# Patient Record
Sex: FEMALE | Race: OTHER | Employment: OTHER | URBAN - METROPOLITAN AREA
[De-identification: names, ages, dates, MRNs, and addresses within clinical notes are randomized per-mention and may not be internally consistent; named-entity substitution may affect disease eponyms.]

---

## 2017-11-13 ENCOUNTER — APPOINTMENT (OUTPATIENT)
Dept: GENERAL RADIOLOGY | Facility: HOSPITAL | Age: 82
End: 2017-11-13
Attending: EMERGENCY MEDICINE
Payer: MEDICARE

## 2017-11-13 ENCOUNTER — HOSPITAL ENCOUNTER (EMERGENCY)
Facility: HOSPITAL | Age: 82
Discharge: HOME | End: 2017-11-13
Attending: EMERGENCY MEDICINE
Payer: MEDICARE

## 2017-11-13 VITALS
HEIGHT: 65 IN | TEMPERATURE: 98 F | SYSTOLIC BLOOD PRESSURE: 189 MMHG | RESPIRATION RATE: 18 BRPM | DIASTOLIC BLOOD PRESSURE: 81 MMHG | OXYGEN SATURATION: 97 % | WEIGHT: 140 LBS | HEART RATE: 68 BPM | BODY MASS INDEX: 23.32 KG/M2

## 2017-11-13 DIAGNOSIS — IMO0001 COMPRESSION FRACTURE OF VERTEBRA, INITIAL ENCOUNTER: Primary | ICD-10-CM

## 2017-11-13 PROCEDURE — 99283 EMERGENCY DEPT VISIT LOW MDM: CPT

## 2017-11-13 PROCEDURE — 99284 EMERGENCY DEPT VISIT MOD MDM: CPT

## 2017-11-13 PROCEDURE — 72100 X-RAY EXAM L-S SPINE 2/3 VWS: CPT | Performed by: EMERGENCY MEDICINE

## 2017-11-13 PROCEDURE — 73502 X-RAY EXAM HIP UNI 2-3 VIEWS: CPT | Performed by: EMERGENCY MEDICINE

## 2017-11-13 RX ORDER — HYDROCORTISONE 10 MG/1
10 TABLET ORAL 2 TIMES DAILY
Status: ON HOLD | COMMUNITY
End: 2019-05-17

## 2017-11-13 RX ORDER — TRAMADOL HYDROCHLORIDE 50 MG/1
TABLET ORAL EVERY 4 HOURS PRN
Qty: 20 TABLET | Refills: 0 | Status: SHIPPED | OUTPATIENT
Start: 2017-11-13 | End: 2017-11-20

## 2017-11-13 NOTE — ED INITIAL ASSESSMENT (HPI)
PT c/o right hip pain that radiates to back for past week. Pt had hip surgery 2yrs ago. Pt daughter believes the pain is arthritic in nature.  Pt to see orthopedic doctor Dec. 6

## 2017-11-13 NOTE — ED PROVIDER NOTES
Patient Seen in: [facility] Emergency Department    History   Patient presents with:  Hip Pain    Stated Complaint: pain in the right hip that radiates to the back for the past 4-5 days    HPI    This is a 66-year-old female complaining of right low acute distress  Abdomen soft nontender the  The back there is no tenderness to palpation. The motor strength lower extremities is normal the deep tendon reflex normal sensations intact the patient has range of motion of the right hip.   Straight leg raisin

## 2017-11-24 ENCOUNTER — HOSPITAL ENCOUNTER (EMERGENCY)
Facility: HOSPITAL | Age: 82
Discharge: HOME | End: 2017-11-25
Attending: EMERGENCY MEDICINE
Payer: MEDICARE

## 2017-11-24 DIAGNOSIS — M54.10 ACUTE LOW BACK PAIN WITH RADICULAR SYMPTOMS, DURATION LESS THAN 6 WEEKS: Primary | ICD-10-CM

## 2017-11-24 DIAGNOSIS — S22.080A T12 COMPRESSION FRACTURE (HCC): ICD-10-CM

## 2017-11-24 DIAGNOSIS — R31.9 HEMATURIA, UNSPECIFIED TYPE: ICD-10-CM

## 2017-11-24 PROCEDURE — 80053 COMPREHEN METABOLIC PANEL: CPT | Performed by: EMERGENCY MEDICINE

## 2017-11-24 PROCEDURE — 96375 TX/PRO/DX INJ NEW DRUG ADDON: CPT

## 2017-11-24 PROCEDURE — 99284 EMERGENCY DEPT VISIT MOD MDM: CPT

## 2017-11-24 PROCEDURE — 83690 ASSAY OF LIPASE: CPT | Performed by: EMERGENCY MEDICINE

## 2017-11-24 PROCEDURE — 96361 HYDRATE IV INFUSION ADD-ON: CPT

## 2017-11-24 PROCEDURE — 96374 THER/PROPH/DIAG INJ IV PUSH: CPT

## 2017-11-24 PROCEDURE — 96376 TX/PRO/DX INJ SAME DRUG ADON: CPT

## 2017-11-24 PROCEDURE — 81001 URINALYSIS AUTO W/SCOPE: CPT | Performed by: EMERGENCY MEDICINE

## 2017-11-24 PROCEDURE — 85025 COMPLETE CBC W/AUTO DIFF WBC: CPT | Performed by: EMERGENCY MEDICINE

## 2017-11-24 RX ORDER — SODIUM CHLORIDE 9 MG/ML
1000 INJECTION, SOLUTION INTRAVENOUS ONCE
Status: COMPLETED | OUTPATIENT
Start: 2017-11-24 | End: 2017-11-25

## 2017-11-25 ENCOUNTER — APPOINTMENT (OUTPATIENT)
Dept: CT IMAGING | Facility: HOSPITAL | Age: 82
End: 2017-11-25
Attending: EMERGENCY MEDICINE
Payer: MEDICARE

## 2017-11-25 VITALS
DIASTOLIC BLOOD PRESSURE: 81 MMHG | HEART RATE: 74 BPM | WEIGHT: 136 LBS | BODY MASS INDEX: 25.03 KG/M2 | OXYGEN SATURATION: 97 % | HEIGHT: 62 IN | RESPIRATION RATE: 16 BRPM | SYSTOLIC BLOOD PRESSURE: 164 MMHG | TEMPERATURE: 98 F

## 2017-11-25 PROCEDURE — 74176 CT ABD & PELVIS W/O CONTRAST: CPT | Performed by: EMERGENCY MEDICINE

## 2017-11-25 RX ORDER — OXYCODONE HYDROCHLORIDE AND ACETAMINOPHEN 5; 325 MG/1; MG/1
1-2 TABLET ORAL EVERY 4 HOURS PRN
Qty: 16 TABLET | Refills: 0 | Status: SHIPPED | OUTPATIENT
Start: 2017-11-25 | End: 2017-12-02

## 2017-11-25 RX ORDER — HYDROMORPHONE HYDROCHLORIDE 1 MG/ML
INJECTION, SOLUTION INTRAMUSCULAR; INTRAVENOUS; SUBCUTANEOUS
Status: DISCONTINUED
Start: 2017-11-25 | End: 2017-11-25

## 2017-11-25 RX ORDER — HYDROMORPHONE HYDROCHLORIDE 1 MG/ML
0.5 INJECTION, SOLUTION INTRAMUSCULAR; INTRAVENOUS; SUBCUTANEOUS ONCE
Status: COMPLETED | OUTPATIENT
Start: 2017-11-25 | End: 2017-11-25

## 2017-11-25 RX ORDER — LIDOCAINE 50 MG/G
1 PATCH TOPICAL EVERY 24 HOURS
Qty: 7 PATCH | Refills: 0 | Status: SHIPPED | OUTPATIENT
Start: 2017-11-25 | End: 2017-12-02

## 2017-11-25 RX ORDER — LIDOCAINE 50 MG/G
1 PATCH TOPICAL ONCE
Status: DISCONTINUED | OUTPATIENT
Start: 2017-11-25 | End: 2017-11-25

## 2017-11-25 RX ORDER — ONDANSETRON 4 MG/1
4 TABLET, ORALLY DISINTEGRATING ORAL EVERY 4 HOURS PRN
Qty: 10 TABLET | Refills: 0 | Status: SHIPPED | OUTPATIENT
Start: 2017-11-25 | End: 2017-12-02

## 2017-11-25 NOTE — ED PROVIDER NOTES
Patient Seen in: [facility] Emergency Department    History   Patient presents with:  Back Pain (musculoskeletal)    Stated Complaint:     HPI  Severe back pain wrapping around the left side of her abdomen was originally wrapping around the right si place, and time. She appears well-developed and well-nourished. She appears distressed. HENT:   Head: Normocephalic. Right Ear: External ear normal.   Left Ear: External ear normal.   Nose: Nose normal.   Mouth/Throat: No oropharyngeal exudate.    Eyes: Urine Large (*)     Protein Urine 30 mg/dL (*)     All other components within normal limits   URINE MICROSCOPIC W REFLEX CULTURE - Abnormal; Notable for the following:     RBC URINE >10 (*)     Bacteria Urine Rare (*)     Hyaline Casts Present (*)     Muc (Reviewed)  ------------------------------------------------------------  Time: 11/25 0128  Value: OCCULT BLOOD: (!) Large  Comment: (Reviewed)  ------------------------------------------------------------  Time: 11/25 0128  Value: KETONES (URINE DIPSTICK) quite a lot of pain here in the emergency department. She does supposedly have a codeine allergy but it gives her a feeling of heartburn.   Hammond she tolerated well in the emergency department and therefore she was given some Dilaudid in the emergency d R-0    lidocaine 5 % External Patch  Place 1 patch onto the skin daily. , Normal, Disp-7 patch, R-0

## 2017-11-25 NOTE — ED INITIAL ASSESSMENT (HPI)
Pt seen here 10 days ago, diagnosed with fracture to spine, states unable to tolerate back pain. Denies incontinence. States has an appointment with Dr Feroz Rodriguez on 12/4 but cannot wait secondary to the pain. Pt takes tramadol at home for back pain.

## 2017-12-08 PROBLEM — S22.080A T12 COMPRESSION FRACTURE (HCC): Status: ACTIVE | Noted: 2017-12-08

## 2018-05-30 PROBLEM — E27.8 ADRENAL HYPERPLASIA (HCC): Status: ACTIVE | Noted: 2018-05-30

## 2018-05-30 PROBLEM — I77.89 ECTASIA OF ARTERY (HCC): Status: ACTIVE | Noted: 2018-05-30

## 2018-05-30 PROBLEM — J44.9 COPD (CHRONIC OBSTRUCTIVE PULMONARY DISEASE) (HCC): Status: ACTIVE | Noted: 2018-05-30

## 2018-05-30 PROBLEM — J84.10 FIBROSIS LUNG (HCC): Status: ACTIVE | Noted: 2018-05-30

## 2019-01-01 ENCOUNTER — TELEPHONE (OUTPATIENT)
Dept: NEUROLOGY | Facility: CLINIC | Age: 84
End: 2019-01-01

## 2019-01-01 ENCOUNTER — OFFICE VISIT (OUTPATIENT)
Dept: NEUROLOGY | Facility: CLINIC | Age: 84
End: 2019-01-01
Payer: MEDICARE

## 2019-01-01 VITALS — HEART RATE: 82 BPM | DIASTOLIC BLOOD PRESSURE: 68 MMHG | RESPIRATION RATE: 14 BRPM | SYSTOLIC BLOOD PRESSURE: 116 MMHG

## 2019-01-01 DIAGNOSIS — I63.9 CEREBROVASCULAR ACCIDENT (CVA), UNSPECIFIED MECHANISM (HCC): Primary | ICD-10-CM

## 2019-01-01 PROCEDURE — 99213 OFFICE O/P EST LOW 20 MIN: CPT | Performed by: OTHER

## 2019-01-01 RX ORDER — AMOXICILLIN 500 MG/1
500 CAPSULE ORAL 3 TIMES DAILY
Status: ON HOLD | COMMUNITY
End: 2020-01-01

## 2019-01-01 RX ORDER — LISINOPRIL 20 MG/1
20 TABLET ORAL DAILY
Refills: 3 | COMMUNITY
Start: 2019-09-26

## 2019-01-01 RX ORDER — ASPIRIN 81 MG/1
81 TABLET ORAL DAILY
Qty: 90 TABLET | Refills: 1 | Status: SHIPPED | OUTPATIENT
Start: 2019-01-01

## 2019-01-01 RX ORDER — ATORVASTATIN CALCIUM 20 MG/1
20 TABLET, FILM COATED ORAL NIGHTLY
Qty: 90 TABLET | Refills: 0 | Status: SHIPPED | OUTPATIENT
Start: 2019-01-01 | End: 2020-01-01

## 2019-05-13 ENCOUNTER — APPOINTMENT (OUTPATIENT)
Dept: GENERAL RADIOLOGY | Facility: HOSPITAL | Age: 84
DRG: 064 | End: 2019-05-13
Attending: EMERGENCY MEDICINE
Payer: MEDICARE

## 2019-05-13 ENCOUNTER — APPOINTMENT (OUTPATIENT)
Dept: CT IMAGING | Facility: HOSPITAL | Age: 84
DRG: 064 | End: 2019-05-13
Attending: EMERGENCY MEDICINE
Payer: MEDICARE

## 2019-05-13 ENCOUNTER — HOSPITAL ENCOUNTER (INPATIENT)
Facility: HOSPITAL | Age: 84
LOS: 4 days | Discharge: NURSING, REHAB OR LONG-TERM CARE | DRG: 064 | End: 2019-05-17
Attending: EMERGENCY MEDICINE | Admitting: HOSPITALIST
Payer: MEDICARE

## 2019-05-13 DIAGNOSIS — R77.8 ELEVATED TROPONIN: ICD-10-CM

## 2019-05-13 DIAGNOSIS — A41.9 SEPSIS DUE TO PNEUMONIA (HCC): Primary | ICD-10-CM

## 2019-05-13 DIAGNOSIS — J18.9 SEPSIS DUE TO PNEUMONIA (HCC): Primary | ICD-10-CM

## 2019-05-13 PROCEDURE — 70450 CT HEAD/BRAIN W/O DYE: CPT | Performed by: EMERGENCY MEDICINE

## 2019-05-13 PROCEDURE — 71045 X-RAY EXAM CHEST 1 VIEW: CPT | Performed by: EMERGENCY MEDICINE

## 2019-05-13 PROCEDURE — 72125 CT NECK SPINE W/O DYE: CPT | Performed by: EMERGENCY MEDICINE

## 2019-05-13 PROCEDURE — 99223 1ST HOSP IP/OBS HIGH 75: CPT | Performed by: HOSPITALIST

## 2019-05-13 RX ORDER — DICLOFENAC SODIUM 75 MG/1
100 TABLET, DELAYED RELEASE ORAL DAILY
COMMUNITY
End: 2019-01-01 | Stop reason: ALTCHOICE

## 2019-05-13 RX ORDER — ONDANSETRON 2 MG/ML
4 INJECTION INTRAMUSCULAR; INTRAVENOUS ONCE
Status: COMPLETED | OUTPATIENT
Start: 2019-05-13 | End: 2019-05-13

## 2019-05-13 RX ORDER — SODIUM CHLORIDE 9 MG/ML
INJECTION, SOLUTION INTRAVENOUS CONTINUOUS
Status: DISCONTINUED | OUTPATIENT
Start: 2019-05-13 | End: 2019-05-15

## 2019-05-13 RX ORDER — ACETAMINOPHEN 500 MG
500 TABLET ORAL EVERY 6 HOURS PRN
Status: DISCONTINUED | OUTPATIENT
Start: 2019-05-13 | End: 2019-05-17

## 2019-05-13 RX ORDER — MULTIVIT WITH MINERALS/LUTEIN
1000 TABLET ORAL DAILY
COMMUNITY

## 2019-05-13 RX ORDER — HYDROCORTISONE 10 MG/1
10 TABLET ORAL 2 TIMES DAILY
Status: DISCONTINUED | OUTPATIENT
Start: 2019-05-13 | End: 2019-05-17

## 2019-05-13 RX ORDER — ACETAMINOPHEN 500 MG
1000 TABLET ORAL EVERY 6 HOURS PRN
Status: DISCONTINUED | OUTPATIENT
Start: 2019-05-13 | End: 2019-05-17

## 2019-05-13 RX ORDER — SODIUM CHLORIDE 9 MG/ML
INJECTION, SOLUTION INTRAVENOUS CONTINUOUS
Status: ACTIVE | OUTPATIENT
Start: 2019-05-13 | End: 2019-05-14

## 2019-05-14 ENCOUNTER — APPOINTMENT (OUTPATIENT)
Dept: CV DIAGNOSTICS | Facility: HOSPITAL | Age: 84
DRG: 064 | End: 2019-05-14
Attending: INTERNAL MEDICINE
Payer: MEDICARE

## 2019-05-14 ENCOUNTER — APPOINTMENT (OUTPATIENT)
Dept: MRI IMAGING | Facility: HOSPITAL | Age: 84
DRG: 064 | End: 2019-05-14
Attending: Other
Payer: MEDICARE

## 2019-05-14 PROCEDURE — 70544 MR ANGIOGRAPHY HEAD W/O DYE: CPT | Performed by: OTHER

## 2019-05-14 PROCEDURE — 99232 SBSQ HOSP IP/OBS MODERATE 35: CPT | Performed by: INTERNAL MEDICINE

## 2019-05-14 PROCEDURE — 93306 TTE W/DOPPLER COMPLETE: CPT | Performed by: INTERNAL MEDICINE

## 2019-05-14 PROCEDURE — 70553 MRI BRAIN STEM W/O & W/DYE: CPT | Performed by: OTHER

## 2019-05-14 PROCEDURE — 99223 1ST HOSP IP/OBS HIGH 75: CPT | Performed by: OTHER

## 2019-05-14 PROCEDURE — 95819 EEG AWAKE AND ASLEEP: CPT | Performed by: OTHER

## 2019-05-14 RX ORDER — BISACODYL 10 MG
10 SUPPOSITORY, RECTAL RECTAL
Status: DISCONTINUED | OUTPATIENT
Start: 2019-05-14 | End: 2019-05-17

## 2019-05-14 RX ORDER — ACETAMINOPHEN 325 MG/1
650 TABLET ORAL EVERY 4 HOURS PRN
Status: DISCONTINUED | OUTPATIENT
Start: 2019-05-14 | End: 2019-05-17

## 2019-05-14 RX ORDER — ONDANSETRON 2 MG/ML
4 INJECTION INTRAMUSCULAR; INTRAVENOUS EVERY 6 HOURS PRN
Status: DISCONTINUED | OUTPATIENT
Start: 2019-05-14 | End: 2019-05-17

## 2019-05-14 RX ORDER — SODIUM PHOSPHATE, DIBASIC AND SODIUM PHOSPHATE, MONOBASIC 7; 19 G/133ML; G/133ML
1 ENEMA RECTAL ONCE AS NEEDED
Status: DISCONTINUED | OUTPATIENT
Start: 2019-05-14 | End: 2019-05-17

## 2019-05-14 RX ORDER — FAMOTIDINE 10 MG/ML
20 INJECTION, SOLUTION INTRAVENOUS DAILY
Status: DISCONTINUED | OUTPATIENT
Start: 2019-05-14 | End: 2019-05-16

## 2019-05-14 RX ORDER — METOCLOPRAMIDE HYDROCHLORIDE 5 MG/ML
10 INJECTION INTRAMUSCULAR; INTRAVENOUS EVERY 8 HOURS PRN
Status: DISCONTINUED | OUTPATIENT
Start: 2019-05-14 | End: 2019-05-17

## 2019-05-14 RX ORDER — DOCUSATE SODIUM 100 MG/1
100 CAPSULE, LIQUID FILLED ORAL 2 TIMES DAILY
Status: DISCONTINUED | OUTPATIENT
Start: 2019-05-14 | End: 2019-05-17

## 2019-05-14 RX ORDER — MORPHINE SULFATE 4 MG/ML
2 INJECTION, SOLUTION INTRAMUSCULAR; INTRAVENOUS EVERY 2 HOUR PRN
Status: DISCONTINUED | OUTPATIENT
Start: 2019-05-14 | End: 2019-05-17

## 2019-05-14 RX ORDER — ACETAMINOPHEN 650 MG/1
650 SUPPOSITORY RECTAL EVERY 4 HOURS PRN
Status: DISCONTINUED | OUTPATIENT
Start: 2019-05-14 | End: 2019-05-17

## 2019-05-14 RX ORDER — FAMOTIDINE 20 MG/1
20 TABLET ORAL DAILY
Status: DISCONTINUED | OUTPATIENT
Start: 2019-05-14 | End: 2019-05-17

## 2019-05-14 RX ORDER — ASPIRIN 325 MG
325 TABLET ORAL DAILY
Status: DISCONTINUED | OUTPATIENT
Start: 2019-05-14 | End: 2019-05-15

## 2019-05-14 RX ORDER — LABETALOL HYDROCHLORIDE 5 MG/ML
10 INJECTION, SOLUTION INTRAVENOUS EVERY 10 MIN PRN
Status: DISCONTINUED | OUTPATIENT
Start: 2019-05-14 | End: 2019-05-17

## 2019-05-14 RX ORDER — SENNOSIDES 8.6 MG
17.2 TABLET ORAL NIGHTLY
Status: DISCONTINUED | OUTPATIENT
Start: 2019-05-14 | End: 2019-05-17

## 2019-05-14 RX ORDER — MORPHINE SULFATE 4 MG/ML
1 INJECTION, SOLUTION INTRAMUSCULAR; INTRAVENOUS EVERY 2 HOUR PRN
Status: DISCONTINUED | OUTPATIENT
Start: 2019-05-14 | End: 2019-05-17

## 2019-05-14 RX ORDER — LORAZEPAM 2 MG/ML
1 INJECTION INTRAMUSCULAR ONCE
Status: COMPLETED | OUTPATIENT
Start: 2019-05-14 | End: 2019-05-14

## 2019-05-14 RX ORDER — ASPIRIN 300 MG
300 SUPPOSITORY, RECTAL RECTAL DAILY
Status: DISCONTINUED | OUTPATIENT
Start: 2019-05-14 | End: 2019-05-15

## 2019-05-14 RX ORDER — POLYETHYLENE GLYCOL 3350 17 G/17G
17 POWDER, FOR SOLUTION ORAL DAILY PRN
Status: DISCONTINUED | OUTPATIENT
Start: 2019-05-14 | End: 2019-05-17

## 2019-05-14 RX ORDER — SODIUM CHLORIDE 9 MG/ML
INJECTION, SOLUTION INTRAVENOUS CONTINUOUS
Status: DISCONTINUED | OUTPATIENT
Start: 2019-05-14 | End: 2019-05-15

## 2019-05-14 RX ORDER — ATORVASTATIN CALCIUM 80 MG/1
80 TABLET, FILM COATED ORAL NIGHTLY
Status: DISCONTINUED | OUTPATIENT
Start: 2019-05-14 | End: 2019-05-15

## 2019-05-14 RX ORDER — SODIUM CHLORIDE 9 MG/ML
INJECTION, SOLUTION INTRAVENOUS ONCE
Status: COMPLETED | OUTPATIENT
Start: 2019-05-14 | End: 2019-05-14

## 2019-05-14 NOTE — PROGRESS NOTES
[facility]  Progress Note     Jessica Cochran Patient Status:  Inpatient    1923 MRN [id number]   [facility] 5NW-A Attending Bharath Barbour MD   Hosp Day # 1 PCP PHYSICIAN NONSTAFF     Chief Complaint: near syncope    S: Tc Esparza hydrocortisone  10 mg Oral BID   • piperacillin-tazobactam  3.375 g Intravenous Q8H       ASSESSMENT / PLAN:     1. Pneumonia  1. Continue IV abx  2. Follow cultures  3. O2 as needed  4. Lactic acid now normal  5. Follow WBC  2.  Elevated Troponin/Demand Is

## 2019-05-14 NOTE — H&P
[facility]  History and Physical     Carlita Salinas Patient Status:  Inpatient    1923 MRN [id number]   [facility] 5NW-A Attending Marcel Albrecht MD   Hosp Day # 0 PCP PHYSICIAN NONSTAFF     Chief Complaint: Near syncope    Hi 1,000 mg by mouth daily. Disp:  Rfl:    Diclofenac Sodium 75 MG Oral Tab EC Take 100 mg by mouth daily. Disp:  Rfl:    Fish Oil-Cholecalciferol (FISH OIL + D3 OR) Take 1 capsule by mouth daily. Disp:  Rfl:    MAGNESIUM OR Take 250 mg by mouth daily.  Disp 98   CO2 27.0   ALKPHO 123   AST 88*   ALT 31   BILT 0.7   TP 8.8*       Estimated Creatinine Clearance: 38.7 mL/min (based on SCr of 0.75 mg/dL). No results for input(s): PTP, INR in the last 168 hours.     Recent Labs   Lab 05/13/19 2020   TROP 3.090*

## 2019-05-14 NOTE — CONSULTS
Neurology H&P    Jesse Nance Patient Status:  Inpatient    1923 MRN [id number]   [facility] 5NW-A Attending Mariana Portillo MD   [facility] Day # 1 PCP PHYSICIAN NONSTAFF     Subjective:  Jesse Nance is a(n) 80year old female with PMH o 05/01/2015    YASH       SocHx:  Social History    Tobacco Use      Smoking status: Never Smoker      Smokeless tobacco: Never Used    Alcohol use: Yes      Comment: occ    Drug use: No      Family History:  Family History   Problem Relation Age of Onset Fasiculations      SENSORY EXAMINATION:  UE: intact to light touch, pinprick reduced on LUE  LE: intact to light touch, pinprick reduced on LLE    COORDINATION:  No dysmetria, or intention tremors; normal JERRY    REFLEXES: 2+ at biceps, 2+ triceps, 2+ at pa Plan:  1. L sided hemiplegia  - Unclear onset  - CTH imaging was reviewed in detail  - MRI/MRA brain ordered  - CPK: 830  - Continue ASA 325mg Qday and Atrovastatin  - BP goals <150SBP  - TTE pending  - PT/OT    Veatrice Sessions, DO  Neurology

## 2019-05-14 NOTE — OCCUPATIONAL THERAPY NOTE
OT orders received, chart reviewed, and attempted to see pt. However, per RN, there is suspicion for a possible stroke. There is a neuro consult pending and CVA order set was just placed.  Pt is being transferred to telemetry due to stroke work up, OT will

## 2019-05-14 NOTE — PHYSICAL THERAPY NOTE
PT orders received, chart reviewed, and attempted to see pt. However, per RN, there is suspicion for a possible stroke. There is a neuro consult pending and CVA order set.  Pt is being transferred to telemetry and therefore will require new PT orders when p

## 2019-05-14 NOTE — PROGRESS NOTES
Pt received from ER via cart  Bed in low and locked position  Call light within reach  Dr Harmon Crest here to see pt.

## 2019-05-14 NOTE — SLP NOTE
Attempted evaluation, patient occupied with EEG. RN reports patient passed RN dysphagia screen and is tolerating po without overt signs of aspiration. Will follow up 5/15/19 to assess per protocol as able.     Aurora Bhatt Felix 87 CCC-SLP  Pager [phone]

## 2019-05-14 NOTE — CM/SW NOTE
Pt is a 81 yo  female admitted for fall at home. Pt lives alone in an apartment. She has 5 children - her dtr Dominique Kirby lives a couple of miles away and checks on pt 3x/day. Pt has been independent for her adls and usually walks unassisted.   Pt has

## 2019-05-14 NOTE — PROGRESS NOTES
05/14/19 0103   Provider Notification   Reason for Communication Critical value   Test/Procedure Name lactic    Provider Name Kita Chery MD   Method of Communication Page   Response Waiting for response   Notification Time 0104   Dr Luci Diaz paged for cr

## 2019-05-14 NOTE — ED PROVIDER NOTES
Ct Brain Or Head (48105)    Result Date: 5/13/2019  PROCEDURE:  CT BRAIN OR HEAD (03790)  COMPARISON:  None. INDICATIONS:  fall  TECHNIQUE:  Noncontrast CT scanning is performed through the brain. Dose reduction techniques were used.  Dose information is t Radiology Data Registry) which includes the Dose Index Registry. PATIENT STATED HISTORY: (As transcribed by Technologist)  Patient presents after falling this morning with urinary incontinence. Unsure if patient hit her head.  FINDINGS: There is reversal Patient is rotated and tilted towards the left. Left lower lobe retrocardiac opacity consistent with atelectasis or consolidation. Diffuse peribronchial thickening. No large pleural effusion or pneumothorax. Right axillary surgical clips.       Erica You Alcohol use: Yes      Comment: occ    Drug use: No      Review of Systems    Positive for stated complaint: fall  Other systems are as noted in HPI. Constitutional and vital signs reviewed.       All other systems reviewed and negative except as noted abov components:    Glucose Urine 150  (*)     Ketones Urine 20  (*)     Blood Urine Moderate (*)     Protein Urine >=500 (*)     RBC URINE >10 (*)     Hyaline Casts Present (*)     Mucous Urine 1+ (*)     All other components within normal limits   CBC W/ DIFF likely sequelae of chronic small vessel ischemic disease. Ventricles and sulci are appropriate for the patient's age. Numerous calcifications throughout the gyri and within the brain parenchyma including the basal ganglia are noted.   This is noted througho chloride 0.9% IV bolus 1,000 mL (1,000 mL Intravenous New Bag 5/13/19 2009)   ondansetron HCl (ZOFRAN) injection 4 mg (4 mg Intravenous Given 5/13/19 2048)           MDM   Patient was given IV fluid per sepsis protocol.   She was given IV antibiotics for po

## 2019-05-14 NOTE — CONSULTS
[facility]  Report of Consultation    Ti Miller Patient Status:  Inpatient    1923 MRN [id number]   [facility] 7NE-A Attending William Corley MD   Hosp Day # 1 PCP PHYSICIAN NONSTAFF     Reason for Consultation:  Abnormal COMMENTS)    Comment:Tachycardia, abd cramping, itching, swelling,             rapid breathing    Medications:    Current Facility-Administered Medications:   •  0.9%  NaCl infusion, , Intravenous, Continuous  •  acetaminophen (TYLENOL) tab 650 mg, 650 mg, EXTRA STRENGTH) tab 1,000 mg, 1,000 mg, Oral, Q6H PRN    Review of Systems:  All systems were reviewed and are negative except as described above in HPI. Physical Exam:  Blood pressure 137/63, pulse 69, temperature 98.5 °F (36.9 °C), resp.  rate 23, heig Sepsis due to pneumonia ([facility] [address]     Elevated troponin     Dehydration     Fall     Neurocysticercosis      Mechanical fall with prolonged down time. Persistent left sided motor deficits suggestive of acute CVA as etiology. Per neurology.   MRI brain pending

## 2019-05-14 NOTE — PROGRESS NOTES
05/14/19 1251   Clinical Encounter Type   Visited With Patient   Patient's Supportive Strategies/Resources Father Kera Ta provided prayer, Scripture, support and Sacrament of the Sick.     Sacramental Encounters   Sacrament of Sick-Anointing Anointed

## 2019-05-14 NOTE — PROGRESS NOTES
Pt noted to have L sided weakness. Talking normally, no droop noted. Pt does not know when this began and per granddaughter pt complained of weakness yesterday. MD notified, at bedside to assess pt. Order to consult neuro and transfer pt to tele.  Neuro con

## 2019-05-14 NOTE — PROCEDURES
[facility] - ELECTROENCEPHALOGRAM (EEG) REPORT  Patient Name:  Brandon Gilbert   MRN / CSN:  [id number] / [id number]   Date of Birth / Age:  6/16/1923 /  80year old   Encounter Date:  5/14/19         METHODS:  Twenty-two electrodes were applied according to the 10-20

## 2019-05-14 NOTE — ED INITIAL ASSESSMENT (HPI)
Per EMS patient had a fall around 8 am- with urinary incontinence. Has been on the floor since then. Robyn Aburto on [employer].

## 2019-05-14 NOTE — PLAN OF CARE
Problem: Patient/Family Goals  Goal: Patient/Family Long Term Goal  Description  Patient's Long Term Goal: no pna    Interventions:  - iv antibiotics  - See additional Care Plan goals for specific interventions   Outcome: Progressing  Goal: Patient/Famil

## 2019-05-15 ENCOUNTER — APPOINTMENT (OUTPATIENT)
Dept: ULTRASOUND IMAGING | Facility: HOSPITAL | Age: 84
DRG: 064 | End: 2019-05-15
Attending: PHYSICIAN ASSISTANT
Payer: MEDICARE

## 2019-05-15 PROCEDURE — 99233 SBSQ HOSP IP/OBS HIGH 50: CPT | Performed by: INTERNAL MEDICINE

## 2019-05-15 PROCEDURE — 99232 SBSQ HOSP IP/OBS MODERATE 35: CPT | Performed by: OTHER

## 2019-05-15 PROCEDURE — 93880 EXTRACRANIAL BILAT STUDY: CPT | Performed by: PHYSICIAN ASSISTANT

## 2019-05-15 RX ORDER — ATORVASTATIN CALCIUM 40 MG/1
40 TABLET, FILM COATED ORAL NIGHTLY
Status: DISCONTINUED | OUTPATIENT
Start: 2019-05-15 | End: 2019-05-17

## 2019-05-15 RX ORDER — CLOPIDOGREL BISULFATE 75 MG/1
75 TABLET ORAL DAILY
Status: DISCONTINUED | OUTPATIENT
Start: 2019-05-15 | End: 2019-05-17

## 2019-05-15 RX ORDER — POTASSIUM CHLORIDE 20 MEQ/1
40 TABLET, EXTENDED RELEASE ORAL EVERY 4 HOURS
Status: COMPLETED | OUTPATIENT
Start: 2019-05-15 | End: 2019-05-15

## 2019-05-15 RX ORDER — ASPIRIN 81 MG/1
81 TABLET ORAL DAILY
Status: DISCONTINUED | OUTPATIENT
Start: 2019-05-16 | End: 2019-05-17

## 2019-05-15 RX ORDER — POTASSIUM CHLORIDE 20 MEQ/1
40 TABLET, EXTENDED RELEASE ORAL EVERY 4 HOURS
Status: DISPENSED | OUTPATIENT
Start: 2019-05-15 | End: 2019-05-16

## 2019-05-15 NOTE — CM/SW NOTE
PT/OT saw pt and they are recommending acute rehab. Family is agreeable to having pt evaluated for AR.   Referral made to  for eval.  SW to f/u with d/c plans pending  eval.

## 2019-05-15 NOTE — OCCUPATIONAL THERAPY NOTE
Per stroke protocol, pt is on bedrest until 11:57. OT will evaluate the pt once the activity level is upgraded.

## 2019-05-15 NOTE — OCCUPATIONAL THERAPY NOTE
OCCUPATIONAL THERAPY EVALUATION - INPATIENT     Room Number: 3398/9774-B  Evaluation Date: 5/15/2019  Type of Evaluation: Initial  Presenting Problem: acute infarct    Physician Order: IP Consult to Occupational Therapy  Reason for Therapy: ADL/IADL Dysfun 05/01/2015    YASH       OCCUPATIONAL PROFILE    HOME SITUATION  Type of Home: House     Lives With: Alone                  Hand Dominance: Right  Drives: No  Patient Regularly Uses: Reading glasses    Prior Level of Function: Independent with ADL, IADL.   C taking off regular lower body clothing?: Total  -   Bathing (including washing, rinsing, drying)?: Total  -   Toileting, which includes using toilet, bedpan or urinal? : Total  -   Putting on and taking off regular upper body clothing?: A Lot  -   Taking c ADL, instrumental activities of daily living, rest and sleep, work, leisure and social participation. Pt will benefit from skilled OT services to maximize independence with ADLs.   Recommend discharge to an inpatient rehabilitation facility where pt can program). Additional Goals:  Complete PHQ 9. Attend to L side 50% of the session time with 1 cueing. Complete L fine motor exercises, at least 3, without cueing.

## 2019-05-15 NOTE — PHYSICAL THERAPY NOTE
PHYSICAL THERAPY EVALUATION - INPATIENT     Room Number: 7699/5760-P  Evaluation Date: 5/15/2019  Type of Evaluation: Initial  Physician Order: PT Eval and Treat    Presenting Problem: acute CVA  Reason for Therapy: Mobility Dysfunction and Discharge SURGICAL HISTORY      right breast lumpectomy   • OTHER SURGICAL HISTORY Right 05/01/2015    YASH       HOME SITUATION  Type of Home: House                    Lives With: Alone  Drives: No     Patient Regularly Uses: Reading glasses    Prior Level of Indepe railing?: Total       AM-PAC Score:  Raw Score: 8   Approx Degree of Impairment: 86.62%   Standardized Score (AM-PAC Scale): 28.58   CMS Modifier (G-Code): CM    FUNCTIONAL ABILITY STATUS  Gait Assessment   Gait Assistance: Not tested                   Ski moderate. These impairments and comorbidities manifest themselves as functional limitations in independent bed mobility, transfers, and gait.   The patient is below baseline and would benefit from skilled inpatient PT to address the above deficits to anh

## 2019-05-15 NOTE — PROGRESS NOTES
[facility]  Progress Note     Haresh Mcmanus Patient Status:  Inpatient    1923 MRN [id number]   [facility] 5NW-A Attending Concha Tran MD   Hosp Day # 2 PCP PHYSICIAN NONSTAFF     Chief Complaint: near syncope    S: Horacio Alexis Clearance: 47.6 mL/min (based on SCr of 0.61 mg/dL). No results for input(s): PTP, INR in the last 168 hours. Recent Labs   Lab 05/13/19 2020 05/14/19  1117   TROP 3.090* 2.360*   *  --             Imaging: Imaging data reviewed in Epic.     Me

## 2019-05-15 NOTE — SLP NOTE
ADULT SWALLOWING EVALUATION    ASSESSMENT    ASSESSMENT/OVERALL IMPRESSION:  Orders were received for a bedside swallowing evaluation per stroke protocol. Patient admitted following a fall at home.    Oral motor mechanism exam revealed functional oral range whether generalized or localized, unspecified site        Prior Living Situation: Home alone  Diet Prior to Admission: Regular; Thin liquids  Precautions: Aspiration    Patient/Family Goals:  To eat and drink    SWALLOWING HISTORY  Current Diet Consistency: silent aspiration.)       Comments:               GOALS  Goal #1 The patient will tolerate regular consistency and thin liquids without overt signs or symptoms of aspiration with 95 % accuracy over 2 session(s).   In Progress   Goal #2 The patient/family/ca

## 2019-05-15 NOTE — PLAN OF CARE
NURSING TRANSFER NOTE      Patient admitted via bed from 1  Report received from [address] at bedside to assist with translation  Oriented to room. Safety precautions initiated. Bed in low position. Call light in reach.

## 2019-05-15 NOTE — PROGRESS NOTES
[facility]  Cardiology Progress Note    Edmar Parada Patient Status:  Inpatient    1923 MRN [id number]   [facility] 7NE-A Attending Juliann Ma MD   [address] Day # 2 PCP PHYSICIAN NONSTAFF     Subjective:  Sleeping but awakens e consistent with RV pressure overload. 3. Aortic valve: Trileaflet; mildly thickened leaflets. Transvalvular     velocity was within the normal range. There was no stenosis. No     regurgitation. 4. Mitral valve: Mildly calcified annulus.  Mitral valve dem following    Plan:   · Continue ASA, statin  · Maintain -180  · PT/OT/speech      ROBE Jameson  5/15/2019  9:55 AM

## 2019-05-15 NOTE — CONSULTS
[address] Patient Status:  Inpatient    1923 MRN [id number]   [facility] 7NE-A Attending Kelley Escobar MD   [facility] Day # 2 PCP PHYSICIAN NONSTAFF     Patient Identification  Alberto Joseph is a 80year old female Echo 5/14/19: EF 65%, no WMA, mild LA enlargement, no overt clot    EEG 5/13/19: IMPRESSION:  This EEG is a normal study recorded in the awake and asleep states.  No focal, lateralizing, or epileptiform activity is seen and no seizures     CXR CONCLUSION: morphINE sulfate (PF) 4 MG/ML injection 2 mg 2 mg Intravenous Q2H PRN   Labetalol HCl (TRANDATE) injection 10 mg 10 mg Intravenous Q10 Min PRN   Senna (SENOKOT) tab 17.2 mg 17.2 mg Oral Nightly   docusate sodium (COLACE) cap 100 mg 100 mg Oral BID   PEG Problem Relation Age of Onset   • Heart Disorder Father    • Cancer Mother         lung cancer   • Cancer Brother        Allergies:    Codeine                 OTHER (SEE COMMENTS)    Comment:Tachycardia, abd cramping, itching, swelling,             rapid is 4-5. ROM WNL. Left Upper Extremity:  Strength is 2. ROM WNL. Right Lower Extremity:  Strength  is 4-5. ROM WNL. Left Lower Extremity: Strength  is 0.   ROM WNL. Neuro: CNII-XII are grossly intact.  Sensation to dull touch intact in all MD  [facility].

## 2019-05-15 NOTE — PLAN OF CARE
Problem: Impaired Swallowing  Goal: Minimize aspiration risk  Description  Interventions:  - Patient should be alert and upright for all feedings (90 degrees preferred)  - Offer food and liquids at a slow rate  - Straws ok  - Encourage small bites of bhavna

## 2019-05-15 NOTE — PROGRESS NOTES
[id number] [facility] Progress Note    Avila Gregg Patient Status:  Inpatient    1923 MRN [id number]   [facility] 7NE-A Attending Peggy Allen MD   [address] Day # 2 PCP PHYSICIAN NONSTAFF         Subjective:  Avila Gregg is greater than left, compatible with areas of acute ischemia/infarction. The largest focal area in the right frontal lobe measures up to 3.0 x 2.1 cm and falls along the right motor strip.     3. Mild to moderate chronic microvascular ischemic changes in the and examined in bed this am. No acute events overnight. Able to wiggle finger more today.  No new comaplints    O:  In addition to the above  MS: awake and alert and oriented x 3  CN: PERRL, EOMI, face sensation is intact, face symmetric, tongue midline, sh

## 2019-05-16 PROCEDURE — 99232 SBSQ HOSP IP/OBS MODERATE 35: CPT | Performed by: HOSPITALIST

## 2019-05-16 RX ORDER — POTASSIUM CHLORIDE 20 MEQ/1
40 TABLET, EXTENDED RELEASE ORAL ONCE
Status: COMPLETED | OUTPATIENT
Start: 2019-05-16 | End: 2019-05-16

## 2019-05-16 RX ORDER — POTASSIUM CHLORIDE 14.9 MG/ML
20 INJECTION INTRAVENOUS ONCE
Status: DISCONTINUED | OUTPATIENT
Start: 2019-05-16 | End: 2019-05-16 | Stop reason: SINTOL

## 2019-05-16 RX ORDER — MAGNESIUM OXIDE 400 MG (241.3 MG MAGNESIUM) TABLET
3 TABLET NIGHTLY PRN
Status: DISCONTINUED | OUTPATIENT
Start: 2019-05-16 | End: 2019-05-17

## 2019-05-16 NOTE — PLAN OF CARE
Assumed care at 1930  Pt a&ox4  Nihs scale improved.  Pt able to lift left arm off bed and hold against gravity  K given IV bc of difficulty swallowing PO  External female catheter applied  Output adequate  Complaints of right ear pain- tylenol given  Iv an

## 2019-05-16 NOTE — CM/SW NOTE
SW noted PMR indicating acute rehab. Spoke with Guille Villafana with [facility]- she is following and notes that [facility] is submitting to [facility] PPO for Jerel Galeano.

## 2019-05-16 NOTE — PROGRESS NOTES
[id number] [facility] Progress Note    Claudetta Seaman Patient Status:  Inpatient    1923 MRN [id number]   [facility] 7NE-A Attending Jc Norton MD   [facility] Day # 3 PCP PHYSICIAN NONSTAFF     Subjective:  Claudetta Seaman is a(n) 9 mg Oral BID   • famoTIDine  20 mg Oral Daily   • psyllium  1 packet Oral Daily   • hydrocortisone  10 mg Oral BID   • piperacillin-tazobactam  3.375 g Intravenous Q8H     Imaging:  CUS 5/15/19  CONCLUSION:  No evidence of hemodynamically carotid occlusive chronic  Possible PNA    Plan:  Stroke order set in place, work up is completed   - NIHSS q day, Neuro checks q4   - Continue DAPT ASA 81 mg and Plavix 75 mg daily x 21 days, then transition to ASA alone   - continue Atorvastatin 40 mg nightly, goal LDL <

## 2019-05-16 NOTE — PHYSICAL THERAPY NOTE
PHYSICAL THERAPY TREATMENT NOTE - INPATIENT    Room Number: 9843/6707-Z     Session: 1   Number of Visits to Meet Established Goals: 6    Presenting Problem: acute CVA     History related to current admission: Pt admitted on 5/13/19 after mechanical fall SUBJECTIVE  Pt alert, motivated -video  used for session    Patient’s self-stated goal is to get up    OBJECTIVE  Precautions: Bed/chair alarm(L side inattention,  to 180)    WEIGHT BEARING RESTRICTION  Weight Bearing Restriction: No and use of standing mirror. Pt able to participate in dynamic reaching. Noted pt able to begin lifting LLE against gravity. Pt returned to bed with max A x 2. Updated pt and grand daughter on role of PT, POC, DC planning/recs, positioning.,activity. supine - sit EOB @ level: maximum assistance      Goal #2 Patient is able to demonstrate transfers  at assistance level: not tested      Goal #3 Patient will sit at EOB x 15 minutes with mod A for static sitting balance.        Goal #4 Pt will attend to lef

## 2019-05-16 NOTE — PLAN OF CARE
Assumed care @ 0700. Pt a/o x4,   Neuro checks q4. Pt able to lift and move left arm, improving. NIH 8.  's this afternoon. Neuro and Cards aware. New BP parameter per neuro: 110-150. New BP med started per Cards. Other VSS. Tele SR.     No acu elbow of weak side to prevent shoulder subluxation  - Encourage patient to incorporate impaired side during daily activities to promote function     Outcome: Progressing     Problem: Impaired Functional Mobility  Goal: Achieve highest/safest level of mobil functionality and self care  Description  INTERVENTIONS  - Monitor swallowing and airway patency with patient fatigue and changes in neurological status  - Encourage and assist patient to increase activity and self care with guidance from PT/OT  - Encourag

## 2019-05-16 NOTE — PROGRESS NOTES
[facility]  Cardiology Progress Note    Pieter Paul Patient Status:  Inpatient    1923 MRN [id number]   [facility] 7NE-A Attending Tedd Lesch, MD   Hosp Day # 3 PCP PHYSICIAN NONSTAFF     Subjective:  Granddaughter at bedside neurology. ROBE Antoine  5/16/2019  10:36 AM    Seen and examined. Agree with above as amended. Compensated cardiac status. Rehab evaluation. D/W pt and granddaughter. Will sign off at this time. Thanks.     Naomi Her MD

## 2019-05-16 NOTE — OCCUPATIONAL THERAPY NOTE
OCCUPATIONAL THERAPY TREATMENT NOTE - INPATIENT     Room Number: 6844/4051-R  Session: 1   Number of Visits to Meet Established Goals: 7    Presenting Problem: acute infarct    History related to current admission: Pt was admitted from home on 5/14 with fa stand    OBJECTIVE  Precautions: Bed/chair alarm    WEIGHT BEARING RESTRICTION  Weight Bearing Restriction: None                PAIN ASSESSMENT  Ratin           ACTIVITY TOLERANCE                         O2 SATURATIONS                ACTIVITIES OF SORAIDA to L side and activity endurance. Highly motivated. Patient presents with the following performance deficits: decreased L UE ROM, impaired L UE strength, impaired trunk control, and decreased sitting balance.    These deficits impact the patient’s ability t

## 2019-05-16 NOTE — SLP NOTE
SPEECH DAILY NOTE - INPATIENT    ASSESSMENT & PLAN   ASSESSMENT  Patient seen for dysphagia treatment to assess tolerance with recommended diet, ensure proper utilization of aspiration precautions and provide patient/family education.    Patient seen with l Progress   Goal #3 The patient will utilize compensatory strategies as outlined by  BSSE (clinical evaluation) including Small bites, Small sips, Alternate liquids/solids, Upright 90 degrees, Eliminate distractions with min-mod assistance 95 % of the time

## 2019-05-16 NOTE — PLAN OF CARE
Assumed care at 0730.   VSS, A&Ox4, on RA, NSR on tele  Neuro checks q4, assessment as charted  Incontinent, purewick in place  Seen by therapy, recommending acute rehab  US carotids completed  Patient and family updated on plan of care, will continue to mo Mobility  Goal: Achieve highest/safest level of mobility/gait  Description  Interventions:  - Assess patient's functional ability and stability  - Promote increasing activity/tolerance for mobility and gait  - Educate and engage patient/family in tolerated

## 2019-05-16 NOTE — PROGRESS NOTES
[facility] HOSPITALIST  Progress Note     Ayo Flakitaizaiah Patient Status:  Inpatient    1923 MRN [id number]   [facility] 5NW-A Attending Fern Connors MD   Hosp Day # 3 PCP PHYSICIAN NONSTAFF     Chief Complaint: near syncope    S: hui --    TP 8.8*  --   --   --   --        Estimated Creatinine Clearance: 47.6 mL/min (based on SCr of 0.61 mg/dL). No results for input(s): PTP, INR in the last 168 hours.     Recent Labs   Lab 05/13/19 2020 05/14/19  1117   TROP 3.090* 2.360*

## 2019-05-17 VITALS
RESPIRATION RATE: 19 BRPM | BODY MASS INDEX: 21.68 KG/M2 | SYSTOLIC BLOOD PRESSURE: 182 MMHG | DIASTOLIC BLOOD PRESSURE: 82 MMHG | HEIGHT: 64 IN | WEIGHT: 127 LBS | OXYGEN SATURATION: 96 % | TEMPERATURE: 98 F | HEART RATE: 75 BPM

## 2019-05-17 PROCEDURE — 99239 HOSP IP/OBS DSCHRG MGMT >30: CPT | Performed by: HOSPITALIST

## 2019-05-17 RX ORDER — ATORVASTATIN CALCIUM 40 MG/1
40 TABLET, FILM COATED ORAL NIGHTLY
Qty: 30 TABLET | Refills: 0 | Status: SHIPPED | OUTPATIENT
Start: 2019-05-17 | End: 2019-01-01 | Stop reason: ALTCHOICE

## 2019-05-17 RX ORDER — ASPIRIN 81 MG/1
81 TABLET ORAL DAILY
Qty: 30 TABLET | Refills: 0 | Status: SHIPPED | OUTPATIENT
Start: 2019-05-18 | End: 2019-01-01

## 2019-05-17 RX ORDER — CLOPIDOGREL BISULFATE 75 MG/1
75 TABLET ORAL DAILY
Qty: 17 TABLET | Refills: 0 | Status: SHIPPED | OUTPATIENT
Start: 2019-05-18 | End: 2019-06-04

## 2019-05-17 RX ORDER — AMOXICILLIN AND CLAVULANATE POTASSIUM 875; 125 MG/1; MG/1
1 TABLET, FILM COATED ORAL 2 TIMES DAILY
Qty: 6 TABLET | Refills: 0 | Status: SHIPPED | OUTPATIENT
Start: 2019-05-17 | End: 2019-05-20

## 2019-05-17 RX ORDER — METOPROLOL SUCCINATE 50 MG/1
50 TABLET, EXTENDED RELEASE ORAL DAILY
Qty: 30 TABLET | Refills: 1 | Status: ON HOLD | OUTPATIENT
Start: 2019-05-17 | End: 2020-01-01

## 2019-05-17 NOTE — PROGRESS NOTES
NURSING DISCHARGE NOTE    Discharged Rehab facility via Ambulance. Accompanied by Family member and Support staff  Belongings Taken by patient/family. Pt discharge to Critical access hospital per MD order.   Discharge instructions, medication education, prescription,

## 2019-05-17 NOTE — PROGRESS NOTES
Spoke to [facility], told [facility] can take pt today. Called dtr Jori Espinosa to update, planning to discharge to [facility] around 3 PM by ambulance.

## 2019-05-17 NOTE — CM/SW NOTE
Spoke with Rosa Elena Rose from [facility] who stated they have received insurance auth and can accept pt for admission today. Pt will go to room 2220. RN to call report to [phone]. She requested 3pm discharge.   Spoke with pt's RN who confirmed pt can dischar

## 2019-05-17 NOTE — CM/SW NOTE
Spoke with Fozia Castillo from [facility]  who stating insurance Audie Child is still pending at this time. They do have a bed slated for pt and are hopeful for insurance auth today. / to remain available for support and/or discharge planning.      Penelope Million

## 2019-05-17 NOTE — SLP NOTE
SPEECH/LANGUAGE/COGNITIVE EVALUATION - INPATIENT    Admission Date: 5/13/2019  Evaluation Date: 05/17/19    Reason for Referral: Stroke protocol    ASSESSMENT & PLAN   ASSESSMENT & IMPRESSION    Completed Nikolas Cognitive Assessment (Mo[facility] Syrian Version Progress   Goal #3 The patient will utilize compensatory strategies as outlined by  BSSE (clinical evaluation) including Small bites, Small sips, Alternate liquids/solids, Upright 90 degrees, Eliminate distractions with min-mod assistance 95 % of the time contact [address]

## 2019-05-17 NOTE — PLAN OF CARE
Assumed care at 1900  AOx4, neuro q4h, see assessment  [place], mainly Kiswahili speaking  RA, SR via tele  BP parameters maintained  Still no BM  External female catheter in place without complications  R ear pain - tylenol and ice packs  Total lift  VSS, will

## 2019-05-17 NOTE — PLAN OF CARE
Assumed care @ 0700. Wolof speaking Pt a/o x4, VSS. NIH 7. Neuro checks q4, unchanged. Left leg flaccid. Left arm able to move and lift up with some drift. Tele SR. No acute respiratory distress noted. Denies any pain.   Pt sat in chair, moved of weak side to prevent shoulder subluxation  - Encourage patient to incorporate impaired side during daily activities to promote function     Outcome: Progressing     Problem: Impaired Functional Mobility  Goal: Achieve highest/safest level of mobility/ga functionality and self care  Description  INTERVENTIONS  - Monitor swallowing and airway patency with patient fatigue and changes in neurological status  - Encourage and assist patient to increase activity and self care with guidance from PT/OT  - Encourag

## 2019-05-17 NOTE — OCCUPATIONAL THERAPY NOTE
OCCUPATIONAL THERAPY TREATMENT NOTE - INPATIENT     Room Number: 4880/6351-K  Session: 2   Number of Visits to Meet Established Goals: 7    Presenting Problem: acute infarct    History related to current admission: Pt was admitted from home on 5/14 with fa ASSESSMENT  Ratin           ACTIVITY TOLERANCE                         O2 SATURATIONS                ACTIVITIES OF DAILY LIVING ASSESSMENT  AM-PAC ‘6-Clicks’ Inpatient Daily Activity Short Form  How much help from another person does the patient raman depression  10-14 - Moderate risk of depression  15-19 - Moderately severe risk of depression  20-27 - Severe risk of depression        The patient is functioning below her previous functional level and would benefit from skilled inpatient OT to address th

## 2019-05-17 NOTE — PHYSICAL THERAPY NOTE
PHYSICAL THERAPY TREATMENT NOTE - INPATIENT    Room Number: 0291/6975-O     Session: 2   Number of Visits to Meet Established Goals: 6    Presenting Problem: acute CVA     History related to current admission: Pt admitted on 5/13/19 after mechanical fall [place]an, cool\"    Patient’s self-stated goal is to get up    OBJECTIVE  Precautions: Bed/chair alarm    WEIGHT BEARING RESTRICTION  Weight Bearing Restriction: None                PAIN ASSESSMENT   Ratin  Location: R inner ear pain  Management Techniq sheet  Chair Follow: NA  Sit<>Supine: NA  Stand<>Sit: NA    Comments related to Mobility: Pt transferred up to chair with use of total lift. Pt is able to perform anterior weight shifting with Max A x 1 x 5 reps. Therex as listed below.      Standardized will attend to left side 50% of the time with cueing. Goal #5 Pt's family will be trained in PROM/AAROM L side.    Goal #6     Goal Comments: Goals established on 5/15/2019; goals ongoing 5/17/2019

## 2019-05-18 NOTE — DISCHARGE SUMMARY
[facility] HOSPITALIST  DISCHARGE SUMMARY     Edmar Parada Patient Status:  Inpatient    1923 MRN [id number]   [facility] 7NE-A Attending No att. providers found   Hosp Day # 4 PCP PHYSICIAN NONSTAFF     Date of Admission: 2019  Date fall. Started on ASA and statin. Should cont plavix for 21 days total then asa alone. 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 Also w/ suspected PNA which was mild and improved w/ abx.  Will need placement at [facility] for r Refills:  0     raNITIdine HCl 75 MG Tabs  Commonly known as:  ZANTAC      Take 1 tablet (75 mg total) by mouth 2 (two) times daily. Refills:  0     vitamin C 1000 MG Tabs      Take 1,000 mg by mouth daily.    Refills:  0        STOP taking these medicati

## 2019-06-05 NOTE — PAYOR COMM NOTE
--------------  ADMISSION REVIEW     [employer] Fees MEDICARE ADV PPO  Subscriber #:  [id number]  Authorization Number: [id number]    Admit date: 5/13/19  Admit time: 2235       Admitting Physician: Sandeep Shah MD  Attending Physician:  No att. providers IMPRESSION: Numerous calcifications throughout the supratentorial brain parenchyma is suspicious for sequelae of chronic neurocysticercosis. Sequelae of chronic small vessel ischemic disease is noted.  No evidence of intracranial hemorrhage or extra-axial PROCEDURE:  CT SPINE CERVICAL (CPT=72125)  COMPARISON:  None. INDICATIONS:  fall  TECHNIQUE:  Noncontrast CT scanning of the cervical spine is performed from the skull base through C7. Multiplanar reconstructions are generated.   Dose reduction techniques PROCEDURE:  XR CHEST AP PORTABLE  (CPT=71045)  TECHNIQUE:  AP chest radiograph was obtained. COMPARISON:  None. INDICATIONS:  fall  PATIENT STATED HISTORY: (As transcribed by Technologist)  Patient offered no additional history at this time.     FINDINGS: right breast lumpectomy   • OTHER SURGICAL HISTORY Right 05/01/2015    YASH     Review of Systems  Positive for stated complaint: fall  Other systems are as noted in HPI. Constitutional and vital signs reviewed.       All other systems reviewed and negativ URINALYSIS WITH CULTURE REFLEX - Abnormal; Notable for the following components:    Glucose Urine 150  (*)     Ketones Urine 20  (*)     Blood Urine Moderate (*)     Protein Urine >=500 (*)     RBC URINE >10 (*)     Hyaline Casts Present (*)     Mucous Vince Ill PROCEDURE:  CT BRAIN OR HEAD (63922)  COMPARISON:  None. INDICATIONS:  fall  TECHNIQUE:  Noncontrast CT scanning is performed through the brain. Dose reduction techniques were used.  Dose information is transmitted to the [facility] of Radiology Author:  Kang Kerr MD Service:  Hospitalist Author Type:  Physician    Filed:  5/14/2019  3:36 AM Date of Service:  5/13/2019 11:10 PM Status:  Signed    :  Kang Kerr MD (Physician)           [facility]  History and Physical     Josh Mackenzie Fish Oil-Cholecalciferol (FISH OIL + D3 OR) Take 1 capsule by mouth daily. Disp:  Rfl:    MAGNESIUM OR Take 250 mg by mouth daily. Disp:  Rfl:    Loratadine 10 MG Oral Tablet Dispersible Take 1 tablet (10 mg total) by mouth daily.  Disp:  Rfl: 0   RaNITid Estimated Creatinine Clearance: 38.7 mL/min (based on SCr of 0.75 mg/dL). No results for input(s): PTP, INR in the last 168 hours. Recent Labs   Lab 05/13/19 2020   TROP 3.090*   *       Imaging: Imaging data reviewed in Epic.       ASSESSMENT Dose: 80 mg  Freq: Nightly Route: OR  Start: 05/14/19 2100 End: 05/15/19 0904     2015-Given        0904-D/C'd          Clopidogrel Bisulfate (PLAVIX) tab 75 mg   Dose: 75 mg  Freq: Daily Route: OR  Start: 05/15/19 1630 End: 05/17/19 1902 2048-Given [ Start: 05/13/19 2044 End: 05/13/19 2048    1021-LJYLQ              Piperacillin Sod-Tazobactam So (ZOSYN) 3.375 g in dextrose 5 % 100 mL ADD-vantage   Dose: 3.375 g  Freq: Every 8 hours Route: IV  Last Dose: 3.375 g (05/17/19 1357)  Start: 05/14/19 0600 En Start: 05/13/19 2315 End: 05/17/19 1902    (0735)-Not Given        0831-Given        1001-Given        1102-Given        1014-Given   1902-D/C'd      Senna (SENOKOT) tab 17.2 mg   Dose: 17.2 mg  Freq: Nightly Route: OR  Start: 05/14/19 2100 End: 05/17/19 1 0405-Given   1239-Given     2206-Given        1902-D/C'd        acetaminophen (TYLENOL) tab 650 mg   Dose: 650 mg  Freq: Every 4 hours PRN Route: OR  PRN Reasons: mild pain,Headaches  PRN Comment: and fever >100  Start: 05/14/19 1156 End: 05/17/19 8919 1902-D/C'd      Or  Metoclopramide HCl (REGLAN) injection 10 mg   Dose: 10 mg  Freq: Every 8 hours PRN Route: IV  PRN Reasons: Nausea,vomiting  Start: 05/14/19 1156 End: 05/17/19 1902 1902-D/C'd      morphINE sulfate (PF) 4 MG/ML injection 1

## 2019-06-08 PROCEDURE — 99306 1ST NF CARE HIGH MDM 50: CPT | Performed by: INTERNAL MEDICINE

## 2019-06-10 PROCEDURE — 99309 SBSQ NF CARE MODERATE MDM 30: CPT | Performed by: INTERNAL MEDICINE

## 2019-06-11 ENCOUNTER — EXTERNAL FACILITY (OUTPATIENT)
Dept: INTERNAL MEDICINE CLINIC | Facility: CLINIC | Age: 84
End: 2019-06-11

## 2019-06-11 DIAGNOSIS — I63.9 CEREBROVASCULAR ACCIDENT (CVA), UNSPECIFIED MECHANISM (HCC): ICD-10-CM

## 2019-06-11 DIAGNOSIS — J41.0 SIMPLE CHRONIC BRONCHITIS (HCC): ICD-10-CM

## 2019-06-11 DIAGNOSIS — B69.0 NEUROCYSTICERCOSIS: ICD-10-CM

## 2019-06-12 PROCEDURE — 99308 SBSQ NF CARE LOW MDM 20: CPT | Performed by: INTERNAL MEDICINE

## 2019-06-14 PROCEDURE — 99309 SBSQ NF CARE MODERATE MDM 30: CPT | Performed by: INTERNAL MEDICINE

## 2019-06-17 PROCEDURE — 99308 SBSQ NF CARE LOW MDM 20: CPT | Performed by: INTERNAL MEDICINE

## 2019-06-19 PROCEDURE — 99308 SBSQ NF CARE LOW MDM 20: CPT | Performed by: INTERNAL MEDICINE

## 2019-06-21 PROCEDURE — 99309 SBSQ NF CARE MODERATE MDM 30: CPT | Performed by: INTERNAL MEDICINE

## 2019-06-24 ENCOUNTER — EXTERNAL FACILITY (OUTPATIENT)
Dept: INTERNAL MEDICINE CLINIC | Facility: CLINIC | Age: 84
End: 2019-06-24

## 2019-06-24 DIAGNOSIS — N30.00 ACUTE CYSTITIS WITHOUT HEMATURIA: ICD-10-CM
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
## 2019-06-24 DIAGNOSIS — I63.9 CEREBROVASCULAR ACCIDENT (CVA), UNSPECIFIED MECHANISM (HCC): ICD-10-CM

## 2019-06-24 DIAGNOSIS — Z47.89 UNSPECIFIED ORTHOPEDIC AFTERCARE: ICD-10-CM

## 2019-06-24 DIAGNOSIS — J84.10 FIBROSIS LUNG (HCC): ICD-10-CM

## 2019-06-24 DIAGNOSIS — Z91.81 AT RISK FOR FALLING: ICD-10-CM

## 2019-06-24 DIAGNOSIS — J41.0 SIMPLE CHRONIC BRONCHITIS (HCC): ICD-10-CM

## 2019-06-24 DIAGNOSIS — B69.0 NEUROCYSTICERCOSIS: ICD-10-CM

## 2019-06-24 DIAGNOSIS — E86.0 DEHYDRATION: ICD-10-CM

## 2019-06-24 DIAGNOSIS — J18.9 COMMUNITY ACQUIRED PNEUMONIA, UNSPECIFIED LATERALITY: ICD-10-CM

## 2019-06-24 DIAGNOSIS — M16.11 PRIMARY OSTEOARTHRITIS OF RIGHT HIP: ICD-10-CM

## 2019-06-24 DIAGNOSIS — M25.551 PAIN OF RIGHT HIP JOINT: ICD-10-CM

## 2019-06-24 DIAGNOSIS — S22.080D COMPRESSION FRACTURE OF T12 VERTEBRA WITH ROUTINE HEALING, SUBSEQUENT ENCOUNTER: ICD-10-CM

## 2019-06-24 DIAGNOSIS — R77.8 ELEVATED TROPONIN: ICD-10-CM

## 2019-06-24 PROCEDURE — 99309 SBSQ NF CARE MODERATE MDM 30: CPT | Performed by: INTERNAL MEDICINE

## 2019-06-26 PROCEDURE — 99308 SBSQ NF CARE LOW MDM 20: CPT | Performed by: INTERNAL MEDICINE

## 2019-06-28 PROCEDURE — 99308 SBSQ NF CARE LOW MDM 20: CPT | Performed by: INTERNAL MEDICINE

## 2019-07-01 ENCOUNTER — EXTERNAL FACILITY (OUTPATIENT)
Dept: INTERNAL MEDICINE CLINIC | Facility: CLINIC | Age: 84
End: 2019-07-01

## 2019-07-01 DIAGNOSIS — I63.9 CEREBROVASCULAR ACCIDENT (CVA), UNSPECIFIED MECHANISM (HCC): ICD-10-CM

## 2019-07-01 DIAGNOSIS — E27.8 ADRENAL HYPERPLASIA (HCC): ICD-10-CM

## 2019-07-01 DIAGNOSIS — Z91.81 AT RISK FOR FALLING: ICD-10-CM

## 2019-07-01 DIAGNOSIS — J41.0 SIMPLE CHRONIC BRONCHITIS (HCC): ICD-10-CM

## 2019-07-01 DIAGNOSIS — M16.11 PRIMARY OSTEOARTHRITIS OF RIGHT HIP: ICD-10-CM
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
## 2019-07-01 DIAGNOSIS — E86.0 DEHYDRATION: ICD-10-CM

## 2019-07-01 PROCEDURE — 99308 SBSQ NF CARE LOW MDM 20: CPT | Performed by: INTERNAL MEDICINE

## 2019-07-10 PROCEDURE — 99308 SBSQ NF CARE LOW MDM 20: CPT | Performed by: INTERNAL MEDICINE

## 2019-07-18 ENCOUNTER — EXTERNAL FACILITY (OUTPATIENT)
Dept: INTERNAL MEDICINE CLINIC | Facility: CLINIC | Age: 84
End: 2019-07-18

## 2019-07-18 DIAGNOSIS — Z91.81 AT RISK FOR FALLING: ICD-10-CM

## 2019-07-18 DIAGNOSIS — I63.9 CEREBROVASCULAR ACCIDENT (CVA), UNSPECIFIED MECHANISM (HCC): ICD-10-CM

## 2019-07-18 DIAGNOSIS — M16.11 PRIMARY OSTEOARTHRITIS OF RIGHT HIP: ICD-10-CM

## 2019-07-18 DIAGNOSIS — B69.0 NEUROCYSTICERCOSIS: ICD-10-CM
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
## 2019-07-18 DIAGNOSIS — J41.0 SIMPLE CHRONIC BRONCHITIS (HCC): ICD-10-CM

## 2019-07-18 DIAGNOSIS — E86.0 DEHYDRATION: ICD-10-CM

## 2019-07-18 DIAGNOSIS — I77.89 ECTASIA OF ARTERY (HCC): ICD-10-CM

## 2019-07-18 DIAGNOSIS — R26.2 DIFFICULTY WALKING: ICD-10-CM

## 2019-07-18 DIAGNOSIS — J18.9 COMMUNITY ACQUIRED PNEUMONIA, UNSPECIFIED LATERALITY: ICD-10-CM

## 2019-08-13 ENCOUNTER — HOSPITAL ENCOUNTER (OUTPATIENT)
Dept: ULTRASOUND IMAGING | Facility: HOSPITAL | Age: 84
Discharge: HOME | End: 2019-08-13
Payer: MEDICARE

## 2019-08-13 DIAGNOSIS — Z87.440 HISTORY OF RECURRENT UTIS: ICD-10-CM

## 2019-08-13 DIAGNOSIS — N13.30 HYDRONEPHROSIS: ICD-10-CM

## 2019-08-13 PROCEDURE — 76770 US EXAM ABDO BACK WALL COMP: CPT

## 2019-08-13 PROCEDURE — 76770 US EXAM ABDO BACK WALL COMP: CPT | Performed by: INTERNAL MEDICINE

## 2019-09-18 ENCOUNTER — TELEPHONE (OUTPATIENT)
Dept: NEUROLOGY | Facility: CLINIC | Age: 84
End: 2019-09-18

## 2019-09-19 NOTE — TELEPHONE ENCOUNTER
Message left on VM that bhupinder't put on hold until end of today on Oct 4 @ 8:20 with Dr Gerardo Mcintosh and office # and hours given.  That is soonest bhupinder't and could be placed on wait list.

## 2019-11-05 NOTE — PROGRESS NOTES
Neurology H&P    Edmar Parada Patient Status:  No patient class for patient encounter    1923 MRN [id number]   Location [address] [place] [address], [address] [place] [address] Drive, [address] [place] [address] Attending No att. providers found   [facility] Day # 0 PCP MD Andrew Christianson Oral Tab Take 20 mg by mouth daily. 3   • amoxicillin 500 MG Oral Cap Take 500 mg by mouth 3 (three) times daily. For 7 days     • Metoprolol Succinate ER 50 MG Oral Tablet 24 Hr Take 1 tablet (50 mg total) by mouth daily.  30 tablet 1   • aspirin 81 MG Or stated in subjective. Objective/Physical Exam:    Vital Signs:  Blood pressure 116/68, pulse 82, resp. rate 14, not currently breastfeeding.     Gen: Awake and in no apparent distress  HEENT: moist mucus membranes  Neck: Supple  Cardiovascular: Regular r patient motion as above. 2. Focal areas of restricted diffusion are seen along the bilateral frontal lobes, right greater than left, compatible with areas of acute ischemia/infarction. The largest focal area in the right frontal lobe measures up to 3.

## 2019-11-05 NOTE — PROGRESS NOTES
Pt following up for stroke in may of 2019. Pt went to [facility] for therapy and is back home with a caregiver. Pt has deficits to the left side primarily with weakness in left arm and leg. No vision or speech changes.

## 2019-11-11 NOTE — TELEPHONE ENCOUNTER
Called Ameena from [facility] and verified that Dr. Minda Holt ordered the home health orders on 11/5/2019.

## 2019-11-11 NOTE — TELEPHONE ENCOUNTER
Home health initiated today, plan to see pt twice a week for 2 weeks  Ameena [facility]) requesting phone call to know that the Dr will be signing the orders.     Thank you,

## 2019-11-12 NOTE — TELEPHONE ENCOUNTER
Requesting office note from last office visit be faxed over so [place] can start.   Please fax to [phone]

## 2019-11-20 NOTE — TELEPHONE ENCOUNTER
Spoke with Fei from [facility]. Insurance only approved 4 visits and they need orders to DC from agency.

## 2019-11-21 NOTE — TELEPHONE ENCOUNTER
She saw you in the office on 11/5 and you put home PT in her plan. All [facility] needs is for you to say it's okay for her to be discharged from home PT and they'll fax over an order for you to sign.  Per their protocol they need us to verbally tell them it is ok

## 2019-11-21 NOTE — TELEPHONE ENCOUNTER
Im not certain that I even ordered the home health. Pt only follows with me infrequently. What is needed with this order?

## 2019-11-21 NOTE — TELEPHONE ENCOUNTER
Spoke with Fei from [facility] and informed him of Dr Latanya Faust orders. He will fax over order to be signed. I informed him that Dr Rachana Walsh was on call this week and will not be back in the office until probably Monday. He states this is fine.

## 2020-01-01 ENCOUNTER — NURSE ONLY (OUTPATIENT)
Dept: LAB | Age: 85
End: 2020-01-01
Attending: INTERNAL MEDICINE
Payer: MEDICARE

## 2020-01-01 ENCOUNTER — APPOINTMENT (OUTPATIENT)
Dept: CT IMAGING | Facility: HOSPITAL | Age: 85
DRG: 641 | End: 2020-01-01
Attending: EMERGENCY MEDICINE
Payer: MEDICARE

## 2020-01-01 ENCOUNTER — HOSPITAL ENCOUNTER (INPATIENT)
Facility: HOSPITAL | Age: 85
LOS: 5 days | Discharge: HOME HEALTH | DRG: 177 | End: 2020-01-01
Attending: EMERGENCY MEDICINE | Admitting: INTERNAL MEDICINE
Payer: MEDICARE

## 2020-01-01 ENCOUNTER — HOSPITAL ENCOUNTER (OUTPATIENT)
Dept: GENERAL RADIOLOGY | Facility: HOSPITAL | Age: 85
Discharge: HOME | End: 2020-01-01
Attending: INTERNAL MEDICINE
Payer: MEDICARE

## 2020-01-01 ENCOUNTER — APPOINTMENT (OUTPATIENT)
Dept: CT IMAGING | Facility: HOSPITAL | Age: 85
DRG: 177 | End: 2020-01-01
Attending: EMERGENCY MEDICINE
Payer: MEDICARE

## 2020-01-01 ENCOUNTER — APPOINTMENT (OUTPATIENT)
Dept: GENERAL RADIOLOGY | Facility: HOSPITAL | Age: 85
DRG: 177 | End: 2020-01-01
Attending: EMERGENCY MEDICINE
Payer: MEDICARE

## 2020-01-01 ENCOUNTER — APPOINTMENT (OUTPATIENT)
Dept: GENERAL RADIOLOGY | Facility: HOSPITAL | Age: 85
DRG: 641 | End: 2020-01-01
Attending: EMERGENCY MEDICINE
Payer: MEDICARE

## 2020-01-01 ENCOUNTER — HOSPITAL ENCOUNTER (INPATIENT)
Facility: HOSPITAL | Age: 85
LOS: 6 days | Discharge: NURSING, REHAB OR LONG-TERM CARE | DRG: 641 | End: 2020-01-01
Attending: EMERGENCY MEDICINE | Admitting: INTERNAL MEDICINE
Payer: MEDICARE

## 2020-01-01 VITALS
HEIGHT: 62 IN | HEART RATE: 89 BPM | TEMPERATURE: 97 F | SYSTOLIC BLOOD PRESSURE: 127 MMHG | DIASTOLIC BLOOD PRESSURE: 74 MMHG | OXYGEN SATURATION: 98 % | WEIGHT: 123.13 LBS | RESPIRATION RATE: 24 BRPM | BODY MASS INDEX: 22.66 KG/M2

## 2020-01-01 VITALS
WEIGHT: 125 LBS | OXYGEN SATURATION: 96 % | RESPIRATION RATE: 18 BRPM | DIASTOLIC BLOOD PRESSURE: 88 MMHG | HEIGHT: 64 IN | TEMPERATURE: 98 F | SYSTOLIC BLOOD PRESSURE: 123 MMHG | BODY MASS INDEX: 21.34 KG/M2 | HEART RATE: 86 BPM

## 2020-01-01 DIAGNOSIS — R05.3 CHRONIC COUGH: ICD-10-CM

## 2020-01-01 DIAGNOSIS — R69 DIAGNOSIS UNKNOWN: Primary | ICD-10-CM

## 2020-01-01 DIAGNOSIS — U07.1 PNEUMONIA DUE TO 2019 NOVEL CORONAVIRUS: ICD-10-CM

## 2020-01-01 DIAGNOSIS — I48.91 ATRIAL FIBRILLATION WITH RAPID VENTRICULAR RESPONSE (HCC): ICD-10-CM

## 2020-01-01 DIAGNOSIS — J12.82 PNEUMONIA DUE TO 2019 NOVEL CORONAVIRUS: ICD-10-CM

## 2020-01-01 DIAGNOSIS — I48.91 ATRIAL FIBRILLATION WITH RAPID VENTRICULAR RESPONSE (HCC): Primary | ICD-10-CM

## 2020-01-01 DIAGNOSIS — R77.8 TROPONIN I ABOVE REFERENCE RANGE: ICD-10-CM

## 2020-01-01 DIAGNOSIS — R09.02 HYPOXIA: ICD-10-CM

## 2020-01-01 DIAGNOSIS — I63.9 CEREBROVASCULAR ACCIDENT (CVA), UNSPECIFIED MECHANISM (HCC): Primary | ICD-10-CM

## 2020-01-01 DIAGNOSIS — J18.9 COMMUNITY ACQUIRED PNEUMONIA OF RIGHT LUNG, UNSPECIFIED PART OF LUNG: Primary | ICD-10-CM

## 2020-01-01 LAB
ALBUMIN SERPL-MCNC: 2.8 G/DL (ref 3.4–5)
ALBUMIN SERPL-MCNC: 2.9 G/DL (ref 3.4–5)
ALBUMIN SERPL-MCNC: 3 G/DL (ref 3.4–5)
ALBUMIN/GLOB SERPL: 0.7 {RATIO} (ref 1–2)
ALP LIVER SERPL-CCNC: 103 U/L (ref 55–142)
ALP LIVER SERPL-CCNC: 112 U/L (ref 55–142)
ALP LIVER SERPL-CCNC: 93 U/L (ref 55–142)
ALT SERPL-CCNC: 12 U/L (ref 13–56)
ALT SERPL-CCNC: 14 U/L (ref 13–56)
ALT SERPL-CCNC: 15 U/L (ref 13–56)
ANION GAP SERPL CALC-SCNC: 2 MMOL/L (ref 0–18)
ANION GAP SERPL CALC-SCNC: 2 MMOL/L (ref 0–18)
ANION GAP SERPL CALC-SCNC: 3 MMOL/L (ref 0–18)
ANION GAP SERPL CALC-SCNC: 3 MMOL/L (ref 0–18)
ANION GAP SERPL CALC-SCNC: 7 MMOL/L (ref 0–18)
AST SERPL-CCNC: 13 U/L (ref 15–37)
AST SERPL-CCNC: 16 U/L (ref 15–37)
AST SERPL-CCNC: 17 U/L (ref 15–37)
ATRIAL RATE: 312 BPM
BASOPHILS # BLD AUTO: 0.04 X10(3) UL (ref 0–0.2)
BASOPHILS # BLD AUTO: 0.05 X10(3) UL (ref 0–0.2)
BASOPHILS NFR BLD AUTO: 0.4 %
BASOPHILS NFR BLD AUTO: 0.4 %
BASOPHILS NFR BLD AUTO: 0.5 %
BASOPHILS NFR BLD AUTO: 0.5 %
BILIRUB SERPL-MCNC: 0.3 MG/DL (ref 0.1–2)
BILIRUB SERPL-MCNC: 0.3 MG/DL (ref 0.1–2)
BILIRUB SERPL-MCNC: 0.4 MG/DL (ref 0.1–2)
BILIRUB UR QL STRIP.AUTO: NEGATIVE
BUN BLD-MCNC: 7 MG/DL (ref 7–18)
BUN BLD-MCNC: 7 MG/DL (ref 7–18)
BUN BLD-MCNC: 8 MG/DL (ref 7–18)
BUN BLD-MCNC: 8 MG/DL (ref 7–18)
BUN BLD-MCNC: 9 MG/DL (ref 7–18)
BUN/CREAT SERPL: 11.8 (ref 10–20)
BUN/CREAT SERPL: 12.1 (ref 10–20)
BUN/CREAT SERPL: 12.7 (ref 10–20)
BUN/CREAT SERPL: 15 (ref 10–20)
BUN/CREAT SERPL: 15.7 (ref 10–20)
CALCIUM BLD-MCNC: 8.5 MG/DL (ref 8.5–10.1)
CALCIUM BLD-MCNC: 8.6 MG/DL (ref 8.5–10.1)
CALCIUM BLD-MCNC: 8.7 MG/DL (ref 8.5–10.1)
CALCIUM BLD-MCNC: 8.8 MG/DL (ref 8.5–10.1)
CALCIUM BLD-MCNC: 9.2 MG/DL (ref 8.5–10.1)
CHLORIDE SERPL-SCNC: 102 MMOL/L (ref 98–112)
CHLORIDE SERPL-SCNC: 103 MMOL/L (ref 98–112)
CHLORIDE SERPL-SCNC: 103 MMOL/L (ref 98–112)
CHLORIDE SERPL-SCNC: 106 MMOL/L (ref 98–112)
CHLORIDE SERPL-SCNC: 107 MMOL/L (ref 98–112)
CLARITY UR REFRACT.AUTO: CLEAR
CO2 SERPL-SCNC: 27 MMOL/L (ref 21–32)
CO2 SERPL-SCNC: 30 MMOL/L (ref 21–32)
CO2 SERPL-SCNC: 30 MMOL/L (ref 21–32)
CO2 SERPL-SCNC: 31 MMOL/L (ref 21–32)
CO2 SERPL-SCNC: 33 MMOL/L (ref 21–32)
CREAT BLD-MCNC: 0.51 MG/DL (ref 0.55–1.02)
CREAT BLD-MCNC: 0.55 MG/DL (ref 0.55–1.02)
CREAT BLD-MCNC: 0.58 MG/DL (ref 0.55–1.02)
CREAT BLD-MCNC: 0.6 MG/DL (ref 0.55–1.02)
CREAT BLD-MCNC: 0.68 MG/DL (ref 0.55–1.02)
CRP SERPL-MCNC: <0.29 MG/DL (ref ?–0.3)
D-DIMER: 1.13 UG/ML FEU (ref ?–0.97)
DEPRECATED RDW RBC AUTO: 48.9 FL (ref 35.1–46.3)
DEPRECATED RDW RBC AUTO: 49.5 FL (ref 35.1–46.3)
DEPRECATED RDW RBC AUTO: 49.7 FL (ref 35.1–46.3)
DEPRECATED RDW RBC AUTO: 50.4 FL (ref 35.1–46.3)
DEPRECATED RDW RBC AUTO: 50.4 FL (ref 35.1–46.3)
EOSINOPHIL # BLD AUTO: 0.21 X10(3) UL (ref 0–0.7)
EOSINOPHIL # BLD AUTO: 0.31 X10(3) UL (ref 0–0.7)
EOSINOPHIL # BLD AUTO: 0.32 X10(3) UL (ref 0–0.7)
EOSINOPHIL # BLD AUTO: 0.33 X10(3) UL (ref 0–0.7)
EOSINOPHIL NFR BLD AUTO: 2.1 %
EOSINOPHIL NFR BLD AUTO: 3 %
EOSINOPHIL NFR BLD AUTO: 3.6 %
EOSINOPHIL NFR BLD AUTO: 4 %
ERYTHROCYTE [DISTWIDTH] IN BLOOD BY AUTOMATED COUNT: 15.2 % (ref 11–15)
ERYTHROCYTE [DISTWIDTH] IN BLOOD BY AUTOMATED COUNT: 15.4 % (ref 11–15)
ERYTHROCYTE [DISTWIDTH] IN BLOOD BY AUTOMATED COUNT: 15.7 % (ref 11–15)
GLOBULIN PLAS-MCNC: 3.8 G/DL (ref 2.8–4.4)
GLOBULIN PLAS-MCNC: 4.2 G/DL (ref 2.8–4.4)
GLOBULIN PLAS-MCNC: 4.5 G/DL (ref 2.8–4.4)
GLUCOSE BLD-MCNC: 101 MG/DL (ref 70–99)
GLUCOSE BLD-MCNC: 77 MG/DL (ref 70–99)
GLUCOSE BLD-MCNC: 91 MG/DL (ref 70–99)
GLUCOSE BLD-MCNC: 93 MG/DL (ref 70–99)
GLUCOSE BLD-MCNC: 94 MG/DL (ref 70–99)
GLUCOSE BLD-MCNC: 96 MG/DL (ref 70–99)
GLUCOSE UR STRIP.AUTO-MCNC: NEGATIVE MG/DL
HCT VFR BLD AUTO: 35.1 % (ref 35–48)
HCT VFR BLD AUTO: 36.3 % (ref 35–48)
HCT VFR BLD AUTO: 36.9 % (ref 35–48)
HCT VFR BLD AUTO: 37.4 % (ref 35–48)
HCT VFR BLD AUTO: 40 % (ref 35–48)
HGB BLD-MCNC: 11 G/DL (ref 12–16)
HGB BLD-MCNC: 11.2 G/DL (ref 12–16)
HGB BLD-MCNC: 11.3 G/DL (ref 12–16)
HGB BLD-MCNC: 11.4 G/DL (ref 12–16)
HGB BLD-MCNC: 12.8 G/DL (ref 12–16)
IMM GRANULOCYTES # BLD AUTO: 0.03 X10(3) UL (ref 0–1)
IMM GRANULOCYTES # BLD AUTO: 0.03 X10(3) UL (ref 0–1)
IMM GRANULOCYTES # BLD AUTO: 0.04 X10(3) UL (ref 0–1)
IMM GRANULOCYTES # BLD AUTO: 0.05 X10(3) UL (ref 0–1)
IMM GRANULOCYTES NFR BLD: 0.3 %
IMM GRANULOCYTES NFR BLD: 0.4 %
IMM GRANULOCYTES NFR BLD: 0.4 %
IMM GRANULOCYTES NFR BLD: 0.5 %
KETONES UR STRIP.AUTO-MCNC: NEGATIVE MG/DL
LACTATE SERPL-SCNC: 1 MMOL/L (ref 0.4–2)
LEUKOCYTE ESTERASE UR QL STRIP.AUTO: NEGATIVE
LYMPHOCYTES # BLD AUTO: 1.69 X10(3) UL (ref 1–4)
LYMPHOCYTES # BLD AUTO: 2.04 X10(3) UL (ref 1–4)
LYMPHOCYTES # BLD AUTO: 2.25 X10(3) UL (ref 1–4)
LYMPHOCYTES # BLD AUTO: 2.34 X10(3) UL (ref 1–4)
LYMPHOCYTES NFR BLD AUTO: 20.6 %
LYMPHOCYTES NFR BLD AUTO: 21.3 %
LYMPHOCYTES NFR BLD AUTO: 21.9 %
LYMPHOCYTES NFR BLD AUTO: 25.6 %
M PROTEIN MFR SERPL ELPH: 6.6 G/DL (ref 6.4–8.2)
M PROTEIN MFR SERPL ELPH: 7.1 G/DL (ref 6.4–8.2)
M PROTEIN MFR SERPL ELPH: 7.5 G/DL (ref 6.4–8.2)
MCH RBC QN AUTO: 27.2 PG (ref 26–34)
MCH RBC QN AUTO: 27.3 PG (ref 26–34)
MCH RBC QN AUTO: 27.6 PG (ref 26–34)
MCH RBC QN AUTO: 27.6 PG (ref 26–34)
MCH RBC QN AUTO: 27.9 PG (ref 26–34)
MCHC RBC AUTO-ENTMCNC: 30.2 G/DL (ref 31–37)
MCHC RBC AUTO-ENTMCNC: 30.4 G/DL (ref 31–37)
MCHC RBC AUTO-ENTMCNC: 31.3 G/DL (ref 31–37)
MCHC RBC AUTO-ENTMCNC: 31.4 G/DL (ref 31–37)
MCHC RBC AUTO-ENTMCNC: 32 G/DL (ref 31–37)
MCV RBC AUTO: 87.1 FL (ref 80–100)
MCV RBC AUTO: 87.9 FL (ref 80–100)
MCV RBC AUTO: 88.2 FL (ref 80–100)
MCV RBC AUTO: 89.8 FL (ref 80–100)
MCV RBC AUTO: 89.9 FL (ref 80–100)
MONOCYTES # BLD AUTO: 0.76 X10(3) UL (ref 0.1–1)
MONOCYTES # BLD AUTO: 0.78 X10(3) UL (ref 0.1–1)
MONOCYTES # BLD AUTO: 0.82 X10(3) UL (ref 0.1–1)
MONOCYTES # BLD AUTO: 0.84 X10(3) UL (ref 0.1–1)
MONOCYTES NFR BLD AUTO: 7.7 %
MONOCYTES NFR BLD AUTO: 8.2 %
MONOCYTES NFR BLD AUTO: 9 %
MONOCYTES NFR BLD AUTO: 9.8 %
NEUTROPHILS # BLD AUTO: 5.06 X10 (3) UL (ref 1.5–7.7)
NEUTROPHILS # BLD AUTO: 5.06 X10(3) UL (ref 1.5–7.7)
NEUTROPHILS # BLD AUTO: 5.58 X10 (3) UL (ref 1.5–7.7)
NEUTROPHILS # BLD AUTO: 5.58 X10(3) UL (ref 1.5–7.7)
NEUTROPHILS # BLD AUTO: 6.79 X10 (3) UL (ref 1.5–7.7)
NEUTROPHILS # BLD AUTO: 6.79 X10(3) UL (ref 1.5–7.7)
NEUTROPHILS # BLD AUTO: 6.81 X10 (3) UL (ref 1.5–7.7)
NEUTROPHILS # BLD AUTO: 6.81 X10(3) UL (ref 1.5–7.7)
NEUTROPHILS NFR BLD AUTO: 61 %
NEUTROPHILS NFR BLD AUTO: 64 %
NEUTROPHILS NFR BLD AUTO: 66.2 %
NEUTROPHILS NFR BLD AUTO: 68.6 %
NITRITE UR QL STRIP.AUTO: NEGATIVE
NT-PROBNP SERPL-MCNC: 1179 PG/ML (ref ?–450)
OSMOLALITY SERPL CALC.SUM OF ELEC: 280 MOSM/KG (ref 275–295)
OSMOLALITY SERPL CALC.SUM OF ELEC: 281 MOSM/KG (ref 275–295)
OSMOLALITY SERPL CALC.SUM OF ELEC: 285 MOSM/KG (ref 275–295)
OSMOLALITY SERPL CALC.SUM OF ELEC: 286 MOSM/KG (ref 275–295)
OSMOLALITY SERPL CALC.SUM OF ELEC: 286 MOSM/KG (ref 275–295)
PATIENT FASTING Y/N/NP: YES
PATIENT FASTING Y/N/NP: YES
PH UR STRIP.AUTO: 7 [PH] (ref 4.5–8)
PLATELET # BLD AUTO: 334 10(3)UL (ref 150–450)
PLATELET # BLD AUTO: 354 10(3)UL (ref 150–450)
PLATELET # BLD AUTO: 359 10(3)UL (ref 150–450)
PLATELET # BLD AUTO: 385 10(3)UL (ref 150–450)
PLATELET # BLD AUTO: 395 10(3)UL (ref 150–450)
POTASSIUM SERPL-SCNC: 3.5 MMOL/L (ref 3.5–5.1)
POTASSIUM SERPL-SCNC: 3.5 MMOL/L (ref 3.5–5.1)
POTASSIUM SERPL-SCNC: 3.9 MMOL/L (ref 3.5–5.1)
POTASSIUM SERPL-SCNC: 4 MMOL/L (ref 3.5–5.1)
POTASSIUM SERPL-SCNC: 4.3 MMOL/L (ref 3.5–5.1)
PROCALCITONIN SERPL-MCNC: <0.05 NG/ML (ref ?–0.16)
PROT UR STRIP.AUTO-MCNC: NEGATIVE MG/DL
Q-T INTERVAL: 280 MS
QRS DURATION: 62 MS
QTC CALCULATION (BEZET): 378 MS
R AXIS: -33 DEGREES
RBC # BLD AUTO: 3.98 X10(6)UL (ref 3.8–5.3)
RBC # BLD AUTO: 4.11 X10(6)UL (ref 3.8–5.3)
RBC # BLD AUTO: 4.13 X10(6)UL (ref 3.8–5.3)
RBC # BLD AUTO: 4.16 X10(6)UL (ref 3.8–5.3)
RBC # BLD AUTO: 4.59 X10(6)UL (ref 3.8–5.3)
SARS-COV-2 RNA RESP QL NAA+PROBE: NOT DETECTED
SED RATE-ML: 16 MM/HR (ref 0–25)
SODIUM SERPL-SCNC: 136 MMOL/L (ref 136–145)
SODIUM SERPL-SCNC: 137 MMOL/L (ref 136–145)
SODIUM SERPL-SCNC: 138 MMOL/L (ref 136–145)
SODIUM SERPL-SCNC: 139 MMOL/L (ref 136–145)
SODIUM SERPL-SCNC: 139 MMOL/L (ref 136–145)
SP GR UR STRIP.AUTO: <1.005 (ref 1–1.03)
T AXIS: 44 DEGREES
TROPONIN I SERPL-MCNC: <0.045 NG/ML (ref ?–0.04)
UROBILINOGEN UR STRIP.AUTO-MCNC: <2 MG/DL
VENTRICULAR RATE: 110 BPM
WBC # BLD AUTO: 10.3 X10(3) UL (ref 4–11)
WBC # BLD AUTO: 7.9 X10(3) UL (ref 4–11)
WBC # BLD AUTO: 9.2 X10(3) UL (ref 4–11)
WBC # BLD AUTO: 9.5 X10(3) UL (ref 4–11)
WBC # BLD AUTO: 9.9 X10(3) UL (ref 4–11)

## 2020-01-01 PROCEDURE — 97530 THERAPEUTIC ACTIVITIES: CPT

## 2020-01-01 PROCEDURE — 85379 FIBRIN DEGRADATION QUANT: CPT | Performed by: HOSPITALIST

## 2020-01-01 PROCEDURE — 93005 ELECTROCARDIOGRAM TRACING: CPT

## 2020-01-01 PROCEDURE — 84484 ASSAY OF TROPONIN QUANT: CPT | Performed by: HOSPITALIST

## 2020-01-01 PROCEDURE — 85025 COMPLETE CBC W/AUTO DIFF WBC: CPT | Performed by: INTERNAL MEDICINE

## 2020-01-01 PROCEDURE — 99285 EMERGENCY DEPT VISIT HI MDM: CPT

## 2020-01-01 PROCEDURE — 84132 ASSAY OF SERUM POTASSIUM: CPT | Performed by: HOSPITALIST

## 2020-01-01 PROCEDURE — 83605 ASSAY OF LACTIC ACID: CPT | Performed by: EMERGENCY MEDICINE

## 2020-01-01 PROCEDURE — 84145 PROCALCITONIN (PCT): CPT | Performed by: EMERGENCY MEDICINE

## 2020-01-01 PROCEDURE — 99291 CRITICAL CARE FIRST HOUR: CPT

## 2020-01-01 PROCEDURE — 85730 THROMBOPLASTIN TIME PARTIAL: CPT | Performed by: EMERGENCY MEDICINE

## 2020-01-01 PROCEDURE — 84484 ASSAY OF TROPONIN QUANT: CPT | Performed by: EMERGENCY MEDICINE

## 2020-01-01 PROCEDURE — 82728 ASSAY OF FERRITIN: CPT | Performed by: HOSPITALIST

## 2020-01-01 PROCEDURE — 71046 X-RAY EXAM CHEST 2 VIEWS: CPT | Performed by: INTERNAL MEDICINE

## 2020-01-01 PROCEDURE — 93010 ELECTROCARDIOGRAM REPORT: CPT | Performed by: INTERNAL MEDICINE

## 2020-01-01 PROCEDURE — 87040 BLOOD CULTURE FOR BACTERIA: CPT | Performed by: EMERGENCY MEDICINE

## 2020-01-01 PROCEDURE — 96367 TX/PROPH/DG ADDL SEQ IV INF: CPT

## 2020-01-01 PROCEDURE — 71275 CT ANGIOGRAPHY CHEST: CPT | Performed by: EMERGENCY MEDICINE

## 2020-01-01 PROCEDURE — 97161 PT EVAL LOW COMPLEX 20 MIN: CPT

## 2020-01-01 PROCEDURE — 83615 LACTATE (LD) (LDH) ENZYME: CPT | Performed by: HOSPITALIST

## 2020-01-01 PROCEDURE — 96365 THER/PROPH/DIAG IV INF INIT: CPT

## 2020-01-01 PROCEDURE — 81001 URINALYSIS AUTO W/SCOPE: CPT | Performed by: EMERGENCY MEDICINE

## 2020-01-01 PROCEDURE — 80053 COMPREHEN METABOLIC PANEL: CPT | Performed by: EMERGENCY MEDICINE

## 2020-01-01 PROCEDURE — 86140 C-REACTIVE PROTEIN: CPT | Performed by: HOSPITALIST

## 2020-01-01 PROCEDURE — 80048 BASIC METABOLIC PNL TOTAL CA: CPT | Performed by: INTERNAL MEDICINE

## 2020-01-01 PROCEDURE — 85730 THROMBOPLASTIN TIME PARTIAL: CPT | Performed by: INTERNAL MEDICINE

## 2020-01-01 PROCEDURE — 83735 ASSAY OF MAGNESIUM: CPT | Performed by: HOSPITALIST

## 2020-01-01 PROCEDURE — 85025 COMPLETE CBC W/AUTO DIFF WBC: CPT | Performed by: HOSPITALIST

## 2020-01-01 PROCEDURE — 96366 THER/PROPH/DIAG IV INF ADDON: CPT

## 2020-01-01 PROCEDURE — 81001 URINALYSIS AUTO W/SCOPE: CPT | Performed by: HOSPITALIST

## 2020-01-01 PROCEDURE — 85025 COMPLETE CBC W/AUTO DIFF WBC: CPT | Performed by: EMERGENCY MEDICINE

## 2020-01-01 PROCEDURE — 80053 COMPREHEN METABOLIC PANEL: CPT | Performed by: HOSPITALIST

## 2020-01-01 PROCEDURE — 96368 THER/DIAG CONCURRENT INF: CPT

## 2020-01-01 PROCEDURE — 83880 ASSAY OF NATRIURETIC PEPTIDE: CPT | Performed by: EMERGENCY MEDICINE

## 2020-01-01 PROCEDURE — 80048 BASIC METABOLIC PNL TOTAL CA: CPT | Performed by: HOSPITALIST

## 2020-01-01 PROCEDURE — 36415 COLL VENOUS BLD VENIPUNCTURE: CPT

## 2020-01-01 PROCEDURE — 71045 X-RAY EXAM CHEST 1 VIEW: CPT | Performed by: EMERGENCY MEDICINE

## 2020-01-01 PROCEDURE — 85379 FIBRIN DEGRADATION QUANT: CPT | Performed by: EMERGENCY MEDICINE

## 2020-01-01 PROCEDURE — 93010 ELECTROCARDIOGRAM REPORT: CPT

## 2020-01-01 PROCEDURE — 85049 AUTOMATED PLATELET COUNT: CPT | Performed by: HOSPITALIST

## 2020-01-01 PROCEDURE — 82550 ASSAY OF CK (CPK): CPT | Performed by: HOSPITALIST

## 2020-01-01 PROCEDURE — 80053 COMPREHEN METABOLIC PANEL: CPT

## 2020-01-01 PROCEDURE — 97165 OT EVAL LOW COMPLEX 30 MIN: CPT

## 2020-01-01 PROCEDURE — 85025 COMPLETE CBC W/AUTO DIFF WBC: CPT

## 2020-01-01 PROCEDURE — 85652 RBC SED RATE AUTOMATED: CPT | Performed by: HOSPITALIST

## 2020-01-01 PROCEDURE — 85730 THROMBOPLASTIN TIME PARTIAL: CPT | Performed by: HOSPITALIST

## 2020-01-01 PROCEDURE — 84145 PROCALCITONIN (PCT): CPT | Performed by: HOSPITALIST

## 2020-01-01 PROCEDURE — 84443 ASSAY THYROID STIM HORMONE: CPT | Performed by: EMERGENCY MEDICINE

## 2020-01-01 PROCEDURE — 82962 GLUCOSE BLOOD TEST: CPT

## 2020-01-01 PROCEDURE — 85027 COMPLETE CBC AUTOMATED: CPT | Performed by: HOSPITALIST

## 2020-01-01 PROCEDURE — 97166 OT EVAL MOD COMPLEX 45 MIN: CPT

## 2020-01-01 PROCEDURE — 70450 CT HEAD/BRAIN W/O DYE: CPT | Performed by: EMERGENCY MEDICINE

## 2020-01-01 PROCEDURE — 96375 TX/PRO/DX INJ NEW DRUG ADDON: CPT

## 2020-01-01 PROCEDURE — 97116 GAIT TRAINING THERAPY: CPT

## 2020-01-01 PROCEDURE — 97110 THERAPEUTIC EXERCISES: CPT

## 2020-01-01 PROCEDURE — 97162 PT EVAL MOD COMPLEX 30 MIN: CPT

## 2020-01-01 RX ORDER — ONDANSETRON 4 MG/1
4 TABLET, ORALLY DISINTEGRATING ORAL EVERY 6 HOURS PRN
Status: DISCONTINUED | OUTPATIENT
Start: 2020-01-01 | End: 2020-01-01

## 2020-01-01 RX ORDER — HEPARIN SODIUM AND DEXTROSE 10000; 5 [USP'U]/100ML; G/100ML
12 INJECTION INTRAVENOUS ONCE
Status: COMPLETED | OUTPATIENT
Start: 2020-01-01 | End: 2020-01-01

## 2020-01-01 RX ORDER — ACETAMINOPHEN 325 MG/1
650 TABLET ORAL EVERY 6 HOURS PRN
Status: DISCONTINUED | OUTPATIENT
Start: 2020-01-01 | End: 2020-01-01

## 2020-01-01 RX ORDER — AMLODIPINE BESYLATE 2.5 MG/1
2.5 TABLET ORAL DAILY
Status: DISCONTINUED | OUTPATIENT
Start: 2020-01-01 | End: 2020-01-01

## 2020-01-01 RX ORDER — PROCHLORPERAZINE EDISYLATE 5 MG/ML
5 INJECTION INTRAMUSCULAR; INTRAVENOUS EVERY 6 HOURS PRN
Status: DISCONTINUED | OUTPATIENT
Start: 2020-01-01 | End: 2020-01-01

## 2020-01-01 RX ORDER — POTASSIUM CHLORIDE 20 MEQ/1
40 TABLET, EXTENDED RELEASE ORAL EVERY 4 HOURS
Status: COMPLETED | OUTPATIENT
Start: 2020-01-01 | End: 2020-01-01

## 2020-01-01 RX ORDER — ONDANSETRON 2 MG/ML
4 INJECTION INTRAMUSCULAR; INTRAVENOUS ONCE
Status: COMPLETED | OUTPATIENT
Start: 2020-01-01 | End: 2020-01-01

## 2020-01-01 RX ORDER — LISINOPRIL 20 MG/1
20 TABLET ORAL DAILY
Status: DISCONTINUED | OUTPATIENT
Start: 2020-01-01 | End: 2020-01-01

## 2020-01-01 RX ORDER — ONDANSETRON 2 MG/ML
4 INJECTION INTRAMUSCULAR; INTRAVENOUS EVERY 4 HOURS PRN
Status: DISCONTINUED | OUTPATIENT
Start: 2020-01-01 | End: 2020-01-01

## 2020-01-01 RX ORDER — ONDANSETRON 2 MG/ML
4 INJECTION INTRAMUSCULAR; INTRAVENOUS EVERY 6 HOURS PRN
Status: DISCONTINUED | OUTPATIENT
Start: 2020-01-01 | End: 2020-01-01

## 2020-01-01 RX ORDER — SODIUM CHLORIDE 9 MG/ML
INJECTION, SOLUTION INTRAVENOUS CONTINUOUS
Status: ACTIVE | OUTPATIENT
Start: 2020-01-01 | End: 2020-01-01

## 2020-01-01 RX ORDER — HEPARIN SODIUM 5000 [USP'U]/ML
5000 INJECTION, SOLUTION INTRAVENOUS; SUBCUTANEOUS EVERY 8 HOURS SCHEDULED
Status: DISCONTINUED | OUTPATIENT
Start: 2020-01-01 | End: 2020-01-01

## 2020-01-01 RX ORDER — LORATADINE 10 MG/1
10 TABLET ORAL DAILY
COMMUNITY

## 2020-01-01 RX ORDER — HEPARIN SODIUM AND DEXTROSE 10000; 5 [USP'U]/100ML; G/100ML
INJECTION INTRAVENOUS CONTINUOUS
Status: DISCONTINUED | OUTPATIENT
Start: 2020-01-01 | End: 2020-01-01

## 2020-01-01 RX ORDER — ASPIRIN 81 MG/1
81 TABLET ORAL DAILY
Status: DISCONTINUED | OUTPATIENT
Start: 2020-01-01 | End: 2020-01-01

## 2020-01-01 RX ORDER — AMLODIPINE BESYLATE 2.5 MG/1
2.5 TABLET ORAL DAILY
Status: ON HOLD | COMMUNITY
End: 2020-01-01

## 2020-01-01 RX ORDER — RISPERIDONE 0.25 MG/1
0.25 TABLET, FILM COATED ORAL 2 TIMES DAILY PRN
Status: DISCONTINUED | OUTPATIENT
Start: 2020-01-01 | End: 2020-01-01

## 2020-01-01 RX ORDER — HYDROCORTISONE 25 MG/G
CREAM TOPICAL 2 TIMES DAILY
Status: DISCONTINUED | OUTPATIENT
Start: 2020-01-01 | End: 2020-01-01

## 2020-01-01 RX ORDER — LABETALOL HYDROCHLORIDE 5 MG/ML
20 INJECTION, SOLUTION INTRAVENOUS ONCE
Status: DISCONTINUED | OUTPATIENT
Start: 2020-01-01 | End: 2020-01-01

## 2020-01-01 RX ORDER — PSEUDOEPHEDRINE HCL 30 MG
100 TABLET ORAL 2 TIMES DAILY
Qty: 60 CAPSULE | Refills: 0 | Status: SHIPPED | OUTPATIENT
Start: 2020-01-01

## 2020-01-01 RX ORDER — ALBUTEROL SULFATE 90 UG/1
2 AEROSOL, METERED RESPIRATORY (INHALATION) EVERY 6 HOURS PRN
Status: DISCONTINUED | OUTPATIENT
Start: 2020-01-01 | End: 2020-01-01

## 2020-01-01 RX ORDER — SODIUM CHLORIDE 9 MG/ML
INJECTION, SOLUTION INTRAVENOUS CONTINUOUS
Status: DISCONTINUED | OUTPATIENT
Start: 2020-01-01 | End: 2020-01-01

## 2020-01-01 RX ORDER — ATORVASTATIN CALCIUM 20 MG/1
20 TABLET, FILM COATED ORAL NIGHTLY
Status: DISCONTINUED | OUTPATIENT
Start: 2020-01-01 | End: 2020-01-01

## 2020-01-01 RX ORDER — DILTIAZEM HYDROCHLORIDE 120 MG/1
120 CAPSULE, COATED, EXTENDED RELEASE ORAL DAILY
Qty: 30 CAPSULE | Refills: 3 | Status: SHIPPED | OUTPATIENT
Start: 2020-01-01

## 2020-01-01 RX ORDER — MECLIZINE HCL 12.5 MG/1
12.5 TABLET ORAL 3 TIMES DAILY PRN
Status: DISCONTINUED | OUTPATIENT
Start: 2020-01-01 | End: 2020-01-01

## 2020-01-01 RX ORDER — ATORVASTATIN CALCIUM 20 MG/1
20 TABLET, FILM COATED ORAL NIGHTLY
Qty: 90 TABLET | Refills: 0 | Status: ON HOLD | OUTPATIENT
Start: 2020-01-01 | End: 2020-01-01

## 2020-01-01 RX ORDER — HEPARIN SODIUM 5000 [USP'U]/ML
60 INJECTION INTRAVENOUS; SUBCUTANEOUS ONCE
Status: COMPLETED | OUTPATIENT
Start: 2020-01-01 | End: 2020-01-01

## 2020-01-01 RX ORDER — DOCUSATE SODIUM 100 MG/1
100 CAPSULE, LIQUID FILLED ORAL 2 TIMES DAILY
Status: DISCONTINUED | OUTPATIENT
Start: 2020-01-01 | End: 2020-01-01

## 2020-01-01 RX ORDER — DILTIAZEM HYDROCHLORIDE 5 MG/ML
10 INJECTION INTRAVENOUS EVERY 2 HOUR PRN
Status: DISCONTINUED | OUTPATIENT
Start: 2020-01-01 | End: 2020-01-01

## 2020-01-01 RX ORDER — POLYETHYLENE GLYCOL 3350 17 G/17G
17 POWDER, FOR SOLUTION ORAL DAILY PRN
Status: DISCONTINUED | OUTPATIENT
Start: 2020-01-01 | End: 2020-01-01

## 2020-01-01 RX ORDER — SODIUM PHOSPHATE, DIBASIC AND SODIUM PHOSPHATE, MONOBASIC 7; 19 G/133ML; G/133ML
1 ENEMA RECTAL ONCE AS NEEDED
Status: ACTIVE | OUTPATIENT
Start: 2020-01-01 | End: 2020-01-01

## 2020-01-01 RX ORDER — ASPIRIN 81 MG/1
81 TABLET, CHEWABLE ORAL ONCE
Status: COMPLETED | OUTPATIENT
Start: 2020-01-01 | End: 2020-01-01

## 2020-01-01 RX ORDER — POLYETHYLENE GLYCOL 3350 17 G/17G
17 POWDER, FOR SOLUTION ORAL DAILY
Qty: 30 EACH | Refills: 0 | Status: SHIPPED | OUTPATIENT
Start: 2020-01-01

## 2020-01-01 RX ORDER — SODIUM CHLORIDE 9 MG/ML
125 INJECTION, SOLUTION INTRAVENOUS CONTINUOUS
Status: DISCONTINUED | OUTPATIENT
Start: 2020-01-01 | End: 2020-01-01

## 2020-01-01 RX ORDER — AZITHROMYCIN 250 MG/1
250 TABLET, FILM COATED ORAL
Status: DISCONTINUED | OUTPATIENT
Start: 2020-01-01 | End: 2020-01-01

## 2020-01-01 RX ORDER — DILTIAZEM HYDROCHLORIDE 120 MG/1
120 CAPSULE, EXTENDED RELEASE ORAL DAILY
Status: DISCONTINUED | OUTPATIENT
Start: 2020-01-01 | End: 2020-01-01

## 2020-01-31 NOTE — TELEPHONE ENCOUNTER
Medication: ATORVASTATIN 20 MG Oral Tab    Date of last refill: 11/05/19 (#90/0)  [place]    Last office visit: 11/5/2019  Due back to clinic per last office note:  Around 02/05/2020  Date next office visit scheduled

## 2020-05-22 PROBLEM — E87.1 HYPONATREMIA: Status: ACTIVE | Noted: 2020-01-01

## 2020-05-22 PROBLEM — E87.6 HYPOKALEMIA: Status: ACTIVE | Noted: 2020-01-01

## 2020-05-22 PROBLEM — R73.9 HYPERGLYCEMIA: Status: ACTIVE | Noted: 2020-01-01

## 2020-05-22 PROCEDURE — 99233 SBSQ HOSP IP/OBS HIGH 50: CPT | Performed by: INTERNAL MEDICINE

## 2020-05-23 PROBLEM — U07.1 PNEUMONIA DUE TO 2019 NOVEL CORONAVIRUS: Status: ACTIVE | Noted: 2020-01-01

## 2020-05-23 PROBLEM — J12.82 PNEUMONIA DUE TO 2019 NOVEL CORONAVIRUS: Status: ACTIVE | Noted: 2020-01-01

## 2020-05-23 PROBLEM — I48.91 A-FIB (HCC): Status: ACTIVE | Noted: 2020-01-01

## 2020-05-23 PROBLEM — I48.91 ATRIAL FIBRILLATION WITH RAPID VENTRICULAR RESPONSE (HCC): Status: ACTIVE | Noted: 2020-01-01

## 2020-05-23 PROBLEM — R77.8 TROPONIN I ABOVE REFERENCE RANGE: Status: ACTIVE | Noted: 2020-01-01

## 2020-05-23 PROBLEM — R09.02 HYPOXIA: Status: ACTIVE | Noted: 2020-01-01

## 2020-05-23 PROCEDURE — 99231 SBSQ HOSP IP/OBS SF/LOW 25: CPT | Performed by: INTERNAL MEDICINE

## 2020-05-23 NOTE — PROGRESS NOTES
05/23/20 1748   Clinical Encounter Type   Visited With Health care provider  (4pm, 5/23/2020  handed off case. . A.M. Sunday trisha asked to meet the POA (daughter) and RN at [address] [place] [address] Ne at the [facility] entrance in order to sign the POLST form.   RN will t

## 2020-05-23 NOTE — PROGRESS NOTES
[facility] Cardiology  Progress Note    Alberto Joseph Patient Status:  Inpatient    1923 MRN [id number]   [facility] 3SW-A Attending Judson Briones, *   Hosp Day # 0 PCP Jeff Stafford MD       Assessment and Plan:    1.  CV - pat 6255)   • diltiazem     • sodium chloride 125 mL/hr (05/22/20 2045)     Scheduled:     • Potassium Chloride ER  40 mEq Oral Q4H   • lisinopril  20 mg Oral Daily   • aspirin  81 mg Oral Daily           Antonio Bro MD

## 2020-05-23 NOTE — ED NOTES
Spoke with patients daughter Meliton Steve. Meliton Steve states that the patient lives alone and has 2 caregivers that come take care of her during the day. Both caregivers have tested positive for covid and the patient has been sick on and off for the last 4 weeks.  Pt

## 2020-05-23 NOTE — ED NOTES
Per Dr. Vesna Solitario, if pts HR stays under 100, pt does not need to be started on cardizem drip. Pts HR has been in the 90's, will continue to monitor.

## 2020-05-23 NOTE — ED INITIAL ASSESSMENT (HPI)
Flu like symptoms x 4 weeks. Cough, SOB, daughters called medics for symptoms. Pt was afebrile with medics, no fever upon arrival. Pt Lithuanian speaking only. Pt is 90% on room air, place on 2L. Pt in afib. Pt has been exposed to 2 +covid patients.

## 2020-05-23 NOTE — ED PROVIDER NOTES
Patient Seen in: [facility] Emergency Department      History   Patient presents with:  Dyspnea LIZBET SOB    Stated Complaint: sob x 4 weeks, +covid x 2    HPI    This is a 59-year-old female who had flulike symptoms about 4 weeks.   Had a cough, short respiratory distress  The patient is in no respiratory distress. The patient is not septic or toxic    HEENT: Atraumatic, conjunctiva are not pale. There is no icterus. Oral mucosa Is wet. No facial trauma. The neck is supple.     LUNGS: Clear to auscul components:    RBC 5.37 (*)     RDW 17.3 (*)     RDW-SD 50.7 (*)     All other components within normal limits   PTT, ACTIVATED - Normal   TSH W REFLEX TO FREE T4 - Normal   PROCALCITONIN - Normal   CBC WITH DIFFERENTIAL WITH PLATELET    Narrative:      The effusion or obvious CHF. CONCLUSION:    Pneumonia with superimposed chronic lung disease.    Dictated by: Romel Lemus MD on 5/22/2020 at 8:44 PM     Finalized by: Romel Lemus MD on 5/22/2020 at 8:49 PM          The patient's KOVID test was positive

## 2020-05-23 NOTE — CONSULTS
[facility]    Inpatient Cardiology Consultation Note    Zach Campo Patient Status:  Emergency    1923 MRN [id number]   Location [address] [facility] Attending Lisa Bateman MD   Hosp Day # 0 PCP Lou Pollock neurocysticercosis consulted for rapid AF. AF:  -Appears to be new onset AF likely in the setting of her acute infectious process  -Patient was started on heparin drip in the ED and is currently rate controlled.   If rates become elevated would start dil BMI 25.42 kg/m²     Deferred      Chaka Bryan MD  5/22/2020  10:30 PM

## 2020-05-23 NOTE — H&P
[facility] Hospitalist H&P       CC: covid +, confusion    PCP: Pedrito Russo MD    History of Present Illness: Pt is a 81 yo with HTN/HL, reported hx of  Neurocysticercosis, CVA with residual L sided weakness is admitted for covid + and confusion.  Her Normocephalic, without obvious abnormality, atraumatic. Eyes:  Sclera anicteric,  EOMs intact. Lids wnl. Ears, nose, throat:  external ears and nose within normal limits, hearing intact       Neck: Supple, symmetrical   Lungs:    No wheezing, Normal e changes including atrophy, white matter disease, and old infarct. No acute process on this noncontrast CT brain scan.    Dictated by: Lauren Hill MD on 5/22/2020 at 10:22 PM     Finalized by: Lauren Hill MD on 5/22/2020 at 10:24 PM          Ct Mayda RVR  -cards on consult, appreciate  -on heparin gtt  -BB  -mildly elevated troponin- suspect secondary to demand    **Ppx  -scds, heparin gtt as above    D/w RNs    Outpatient records or previous hospital records reviewed.      Further recommendations pendi

## 2020-05-23 NOTE — CONSULTS
Pulmonary H&P/Consult       NAME: Hannah Bourgeois - ROOM: [address] [place], [place] [address]P - MRN: [id number] - Age: 80year old - :  1923    Date of Admission: 2020  7:44 PM  Admission Diagnosis: Hypoxia [R09.02]  Atrial fibrillation with rapid ventricular response ([address] Comment:Tachycardia, abd cramping, itching, swelling,             rapid breathing    Social History:  Social History    Socioeconomic History      Marital status:        Spouse name: Not on file      Number of children: Not on file      Years of educ Asked    Social History Narrative      Not on file         Family History:  Family History   Problem Relation Age of Onset   • Heart Disorder Father    • Cancer Mother         lung cancer   • Cancer Brother         Home Medications:  amLODIPine Besylate 2. Weight:       Height:           Oxygen Therapy  SpO2: 97 %  O2 Device: Nasal cannula  O2 Flow Rate (L/min): 1.5 L/min  Pulse Oximetry Type: Continuous  Oximetry Probe Site Changed: No  Pulse Ox Probe Location: Left hand                  Wt Readings from delirium will make use difficult  -wean O2 as tolerated- down to 1L on visit  2.  COVID 19  -IS/Prone  -monitor resp status closely  -will discuss tocilizumab/remdesivir should her resp status worsen, convalescent plasma would also be an option  -trend infl

## 2020-05-23 NOTE — PLAN OF CARE
Patient alert to self and follows basic commands. VSS, saturating well on NC 2L at 97% this AM, will attempt to wean off O2 throughout the day. Patient frequently repositioned, kept clean, dry and comfortable.  Spoke with daughter and was able to complete m support as indicated  - Manage/alleviate anxiety  - Monitor for signs/symptoms of CO2 retention  Outcome: Progressing

## 2020-05-23 NOTE — PLAN OF CARE
Received patient from ER, alert, Thai speaking mainly but speaks and understands simple questions if she likes to. Denied chest pain, denied SOB. Coughs at times. Discussed POC. On Heparin drip per ACS Protocol. Incontinent care done.  Safety measures re effort  - Oxygen supplementation based on oxygen saturation or ABGs  - Provide Smoking Cessation handout, if applicable  - Encourage broncho-pulmonary hygiene including cough, deep breathe, Incentive Spirometry  - Assess the need for suctioning and perform

## 2020-05-23 NOTE — DIETARY NOTE
Radha     Admitting diagnosis:  Hypoxia [R09.02]  Atrial fibrillation with rapid ventricular response ([facility] [address] [I48.91]  Troponin I above reference range [R79.89]  Pneumonia due to 2019 novel coronavirus [U07.1,

## 2020-05-23 NOTE — CONSULTS
INFECTIOUS DISEASE CONSULT NOTE    Bethany Akhtar Patient Status:  Inpatient    1923 MRN [id number]   [facility] 3SW-A Attending Nick Russo, *   Hosp Day # 0 PCP Michelle Cameron injection 4 mg, 4 mg, Intravenous, Q4H PRN  •  heparin (PORCINE) drip 01711gcbdp/250mL infusion CONTINUOUS, 200-3,000 Units/hr, Intravenous, Continuous  •  sodium chloride 0.9% IV bolus 500 mL, 500 mL, Intravenous, PRN  •  acetaminophen (TYLENOL) tab 650 m Pro-Calcitonin  Recent Labs   Lab 05/22/20 1956 05/23/20 0446   PCT <0.05 <0.05       Cardiac  Recent Labs   Lab 05/22/20 1956 05/23/20  0114 05/23/20 0446   TROP 0.060* 0.061* 0.057*   PBNP 2,541*  --   --        Creatinine Kinase  Recent Labs acute process on this noncontrast CT brain scan.    Dictated by: Peggy Pavon MD on 5/22/2020 at 10:22 PM     Finalized by: Peggy Pavon MD on 5/22/2020 at 10:24 PM          Ct Angiography, Chest (cpt=71275)    Result Date: 5/22/2020  PROCEDURE:  CT A lung disease, appears to be most chronic, but with some ground-glass changes especially upper lobes which could reflect active lung disease including pneumonia. No pleural effusion or pneumothorax. No sign of acute pulmonary embolism.    Dictated by: Jesica Verdin assume TME due to above  - follow MS  - no other obvious ongoing infection and PCT neg    Thank you for allowing me to participate in the care of this patient. Please do not hesitate to call if you have any questions.    I will continue to follow with you a

## 2020-05-24 PROCEDURE — 99231 SBSQ HOSP IP/OBS SF/LOW 25: CPT | Performed by: INTERNAL MEDICINE

## 2020-05-24 NOTE — PROGRESS NOTES
05/24/20 1239   Clinical Encounter Type   Visited With Family; Health care provider   Routine Visit Follow-up   Continue Visiting No   Patient Spiritual Encounters   Spiritual Interventions  facilitated signing and witnessing of POLST by POA/faustina

## 2020-05-24 NOTE — PROGRESS NOTES
[facility]  Progress Note    Surya Rosario Patient Status:  Inpatient    1923 MRN [id number]   [facility] 3SW-A Attending Yusuf Carrizales, *   Hosp Day # 1 PCP Kathy Sanchez MD       Assessment and Plan:    1.  Afib -ra HCT 45.1 37.6 37.5   MCV 84.0 83.6 86.0   MCH 27.4 27.3 27.5   MCHC 32.6 32.7 32.0   RDW 17.3* 16.7* 17.0*   NEPRELIM 5.28 5.46 6.26   WBC 8.7 8.6 9.4   .0 343.0 370.0       Recent Labs   Lab 05/22/20  1956 05/23/20  0114 05/23/20  0446   TROP 0.0

## 2020-05-24 NOTE — PROGRESS NOTES
[facility] Hospitalist Progress Note     PCP: Bartolo Angeles MD    CC:  Follow up    SUBJECTIVE:  Pt sitting up in bed, says she feels terrible- fatigued, achy.   NC on her head- so on RA satting 92% while I was in room    OBJECTIVE:  Temp:  [97.6 °F (36.4 °C) Results    COVID-19  Lab Results   Component Value Date    COVID19 Detected (AA) 05/22/2020       Pro-Calcitonin  Recent Labs   Lab 05/22/20 1956 05/23/20  0446   PCT <0.05 <0.05       Cardiac  Recent Labs   Lab 05/22/20 1956 05/23/20  0114 05/23/20  044 neurocystercosis, CVA with residual L sided weakness  -suspect exacerbation of residual symptoms secondary to infectious process above  -CT brain without acute issue  -continue asa, statin     ** new onset afib with RVR  -cards on consult, appreciate  -on

## 2020-05-24 NOTE — PLAN OF CARE
Patient is alert but remains confused to situation. Heparin gtt increased per PTT and protocol. A-fib controlled on the monitor, BP and rate control meds administered.  Writer met with family and Sunita Pleitez to have POLST form signed - will have Hospitalist sig

## 2020-05-24 NOTE — PROGRESS NOTES
INFECTIOUS DISEASE PROGRESS NOTE    Haresh Mcmanus Patient Status:  Inpatient    1923 MRN [id number]   [facility] 3SW-A Attending Dominick Salas, *   Hosp Day # 1 PCP Cachorro Ghosh 8. 5   ALB 3.2* 2.4*  --   --    * 139  --  137   K 3.5 3.5 5.0 4.3   CL 96* 106  --  107   CO2 28.0 28.0  --  27.0   ALKPHO 126 99  --   --    AST 27 21  --   --    ALT 18 12*  --   --    BILT 0.4 0.3  --   --    TP 8.5* 6.6  --   --      Recent Labs VENTRICLES/SULCI:   Ventricles and sulci are prominent indicating atrophy. INTRACRANIAL:  There are no abnormal extraaxial fluid collections. There is no midline shift. There are no acute appearing intraparenchymal brain abnormalities.   There is nothing widespread chronic lung disease especially at the lung base with architectural distortion, pleural and parenchymal scarring and probably slight early honeycombing changes.   There also are ground-glass opacities not associated with  as much architectural di bilateral, appearing more dense than the prior exam from January 2020. Stable cardiac enlargement. No pleural effusion or obvious CHF. CONCLUSION:    Pneumonia with superimposed chronic lung disease.    Dictated by: Yamel Villela MD on 5/22/2020 at 8:44

## 2020-05-24 NOTE — PLAN OF CARE
Received pt at 1930. A&O to self. Tele rhythm afib; controlled rates. O2 sat WNL on room air. Pt denies c/o chest pain or shortness of breath. Pt voiding without issue. Skin dry and intact. Pt has baseline left sided weakness. Pt is wheelchair bound.  Oj Goldberg Spirometry  - Assess the need for suctioning and perform as needed  - Assess and instruct to report SOB or any respiratory difficulty  - Respiratory Therapy support as indicated  - Manage/alleviate anxiety  - Monitor for signs/symptoms of CO2 retention  Antelmo Carl

## 2020-05-24 NOTE — PROGRESS NOTES
Pulmonary Progress Note        NAME: Mack Pettit - ROOM: 368/285-D - MRN: [id number] - Age: 80year old - : 1923        Last 24hrs: No events overnight, resting comfortably in bed, no complaints, weaning down O2    OBJECTIVE:   20  0300  also be an option  -trend inflammatory markers                   Jairon Hicks  [facility] Pulmonary and Critical Care

## 2020-05-25 PROCEDURE — 99231 SBSQ HOSP IP/OBS SF/LOW 25: CPT | Performed by: INTERNAL MEDICINE

## 2020-05-25 NOTE — PROGRESS NOTES
[address] [facility] Patient Status:  Inpatient    1923 MRN [id number]   [facility] 3SW-A Attending Akosua Torres, *   Hosp Day # 2 PCP Aiyana Pereira MD     Pulm / Critical Care Progress Note     S: pt states she 05/24/20  0546   * 139  --  137   K 3.5 3.5 5.0 4.3   CL 96* 106  --  107   CO2 28.0 28.0  --  27.0   BUN 13 10  --  11     Creatinine (mg/dL)   Date Value   05/24/2020 0.51 (L)   05/23/2020 0.54 (L)   05/22/2020 0.69   05/30/2018 0.69   ]    Recent

## 2020-05-25 NOTE — PLAN OF CARE
Patients daughter Saeed Comes called regarding discharge plans. She cannot take patient back to her condo today because the hired caregivers have [place] and are unable to provide care. She will be able to have someone there tomorrow for patient's discharge.  She

## 2020-05-25 NOTE — PLAN OF CARE
Assumed care of patient at 1. Patient resting in bed. AOX1- to self only. Oxygenating >90% on RA. Tachypneic, lung sounds diminished. A-fib on tele. Heparin gtt infusing per protocol, given Metoprolol per orders. VSS. Afebrile. In no apparent distress. Encourage broncho-pulmonary hygiene including cough, deep breathe, Incentive Spirometry  - Assess the need for suctioning and perform as needed  - Assess and instruct to report SOB or any respiratory difficulty  - Respiratory Therapy support as indicated

## 2020-05-25 NOTE — PROGRESS NOTES
[facility]                INFECTIOUS DISEASE PROGRESS NOTE    Allison Aguilar Patient Status:  Inpatient    1923 MRN [id number]   [facility] 3SW-A Attending Anuja Contreras, *   Hosp Day # 2 PCP Bg Shaffer MD     An result)    Collection Time: 05/22/20  8:08 PM   Result Value Ref Range    Blood Culture Result No Growth 2 Days N/A             Problem list reviewed:  Patient Active Problem List:     OA (osteoarthritis) of hip     Hip pain     Hearing impairment     Arth

## 2020-05-25 NOTE — CM/SW NOTE
Daughter,Melodie called me regarding discharge plans for her mother. 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        Pt lives with Juve Purvisr  in a con

## 2020-05-25 NOTE — PROGRESS NOTES
[facility] Hospitalist Progress Note     PCP: Kathy Sanchez MD    CC:  Follow up    SUBJECTIVE:   pt sitting up in bed, eating. Appears comfortable.  On RA  D/w RN Imelda, no overnight events  OBJECTIVE:  Temp:  [98 °F (36.7 °C)-98.8 °F (37.1 °C)] 98.5 °F (36.9 Lab 05/22/20 1956 05/23/20 0446   PCT <0.05 <0.05       Cardiac  Recent Labs   Lab 05/22/20 1956 05/23/20  0114 05/23/20 0446   TROP 0.060* 0.061* 0.057*   PBNP 2,541*  --   --        Creatinine Kinase  Recent Labs   Lab 05/22/20 1956   CK 48 resolved)  -cards on consult, appreciate  -on heparin gtt--> per cards no plans for long term AC  -BB  -mildly elevated troponin- suspect secondary to demand     **Ppx  -scds, heparin gtt as above--> per cards, no plans for long term [facility]    Questions/concer

## 2020-05-26 PROCEDURE — G2012 BRIEF CHECK IN BY MD/QHP: HCPCS | Performed by: INTERNAL MEDICINE

## 2020-05-26 NOTE — PROGRESS NOTES
INFECTIOUS DISEASE PROGRESS NOTE    Esdras Zapien Patient Status:  Inpatient    1923 MRN [id number]   [facility] 3SW-A Attending Krystyna Islas, *   Hosp Day # 3 PCP Kavon Jaramillo GFRAA 85 92  --  94   GFRNAA 74 80  --  81   CA 9.1 8.2*  --  8.5   ALB 3.2* 2.4*  --   --    * 139  --  137   K 3.5 3.5 5.0 4.3   CL 96* 106  --  107   CO2 28.0 28.0  --  27.0   ALKPHO 126 99  --   --    AST 27 21  --   --    ALT 18 12*  --   -- Technologist)  Patient is covid positive with SOB. FINDINGS:   VENTRICLES/SULCI:   Ventricles and sulci are prominent indicating atrophy. INTRACRANIAL:  There are no abnormal extraaxial fluid collections. There is no midline shift.   There are no acute within the pulmonary arterial system. LUNGS/PLEURA:  There is widespread chronic lung disease especially at the lung base with architectural distortion, pleural and parenchymal scarring and probably slight early honeycombing changes.   There also are groun active pneumonia appears likely in the mid and lower lung bilateral, appearing more dense than the prior exam from January 2020. Stable cardiac enlargement. No pleural effusion or obvious CHF.   CONCLUSION:    Pneumonia with superimposed chronic lung disea

## 2020-05-26 NOTE — PROGRESS NOTES
Cardiology    Virtual visit to minimize traffic in Covid + patient room. She has converted to NSR. Continue BB.   If not felt to be a good long term anticoagulation candidate, stop heparin in Erica Lindsey MD

## 2020-05-26 NOTE — PROGRESS NOTES
[facility] Cardiology  Progress Note    Myles Patricia Patient Status:  Inpatient    1923 MRN [id number]   [facility] 3SW-A Attending Tonya Julien, *   Hosp Day # 3 PCP Connie Navarro MD       Assessment and Plan:    1.  Paroxysm discussion with referring team Asa NAGEL in COVID-19 unit) and bedside nursing.       Laboratory/Data:  Labs:  Recent Labs   Lab 05/24/20  2257 05/25/20  0503 05/26/20  0558   PTT 56.8* 64.0* 49.9*       Recent Labs   Lab 05/22/20 1956 05/23/20  0446 05/23

## 2020-05-26 NOTE — PAYOR COMM NOTE
--------------  ADMISSION REVIEW     PayorFrederic Keel MEDICARE ADV PPO  Subscriber #:  [id number]  Authorization Number: [id number]    Admit date: 5/23/20  Admit time: 0012       Admitting Physician: Marko Rodney DO  Attending Physician:  MD ANDREY Rogel All other systems reviewed and negative except as noted above.     Physical Exam     ED Triage Vitals [05/22/20 1950]   BP (!) 139/104   Pulse (!) 121   Resp (!) 30   Temp 97.6 °F (36.4 °C)   Temp src Temporal   SpO2 97 %   O2 Device Nasal cannula     Braulio All other components within normal limits   PRO BETA NATRIURETIC PEPTIDE - Abnormal; Notable for the following components:    Pro-Beta Natriuretic Peptide 2,541 (*)     All other components within normal limits   RAPID SARS-COV-2 BY PCR - Abnormal; Notabl PROCEDURE:  XR CHEST AP PORTABLE  (CPT=71045)  TECHNIQUE:  AP chest radiograph was obtained.   COMPARISON:  [facility] , XR, XR CHEST PA + LAT CHEST (CPT=71046), 1/28/2020, 10:10 AM.  INDICATIONS:  sob x 4 weeks, +covid x 2  PATIENT STATED HISTORY: (As transcri Atrial fibrillation with rapid ventricular response (HCC)  (primary encounter diagnosis)  Pneumonia due to 2019 novel coronavirus  Troponin I above reference range  Hypoxia    Disposition:  Admit  5/22/2020 10:14 pm    Present on Admission  Date Reviewed: /83 (BP Location: Left arm)   Pulse 91   Temp 98.4 °F (36.9 °C) (Axillary)   Resp 25   Ht 157.5 cm (5' 2\")   Wt 139 lb 5.3 oz (63.2 kg)   SpO2 98%   BMI 25.48 kg/m²    General:  Alert, NAD, appears stated age, on 2L, no conversational dyspnea, no ac Lab 05/22/20 1956 05/23/20  0446   CRP  --  2.13*   ANJANA  --  75.9   LDH  --  231   DDIMER 1.09* 0.75     Additional Diagnostics: ECG: afib with RVR     CXR: image personally reviewed agree with radiologist read    Radiology: [address] ([id number]) -reorient frequently    **increased L sided weakness in setting of known hx neurocystercosis, CVA with residual L sided weakness  -suspect exacerbation of residual symptoms secondary to infectious process above  -CT brain without acute issue  -continue asa BILT 0.4 0.3   TP 8.5* 6.6     Component Value Date     COVID19 Detected (AA) 05/22/2020         Pro-Calcitonin       Recent Labs   Lab 05/22/20 1956 05/23/20  0446   PCT <0.05 <0.05         Cardiac        Recent Labs   Lab 05/22/20 1956 05/23/20  0114 0 -will discuss tocilizumab/remdesivir should her resp status worsen, convalescent plasma would also be an option  -trend inflammatory markers            Marcos Hinds  [facility] Pulmonary and Critical Care  5/23/2020 12:31 PM    5/26/2020:  Hospitalist Progress -increased agitation today. Suspect hospital related delirium. Pt asking for something to \"calm her nerves\". I would like to avoid haldol and benzos given her advanced age. Delirium order set placed, PRN risperidone 0.25mg ordered based on RASS score.   - Order specific questions:   What infection is this being used to treat? Stat/one time dose    2101-New Bag   2121-Stopped            heparin (PORCINE) 85413zrdpt/250mL infusion ED INITIAL DOSE   Dose: 12 Units/kg/hr  Weight Dosing Info: 63.1 kg  Freq:  Onc 0614-Paused     2760-Zj-Niks Rate/Dose Change        0737-Mu-Oeva Rate/Dose Change   1610-New Bag     (2122)-Not Given [C]        2031-New Bag        0800-Stopped   0841-D/C'd                Medications 05/22/20 05/23/20 05/24/20 05/25/20 05/26/20   aceta

## 2020-05-26 NOTE — PLAN OF CARE
RECEIVED PATIENT RESTING IN BED. PATIENT ALERT AND ORIENTED TO SELF. CALLING OUT AND BANGING ON BED RAILS. ABLE TO CALM PATIENT  VSS ON ROOM AIR. SR ON TELE. DENIES CHEST PAIN. HEPARIN DRIP INFUSING.   PTT IN AM.  BUE BRUISING NOTED WITH HEMATOMA TO

## 2020-05-26 NOTE — CM/SW NOTE
Spoke with Mikael Obregon with [facility]. They will not be able to accept pt as there are no therapy goals as notes indicate pt is at baseline. Multiple referrals sent.   Options are limited due to pt being CoVid positive and having [employer] and need for facility

## 2020-05-26 NOTE — PROGRESS NOTES
[facility] Hospitalist Progress Note     PCP: Homer Velásquez MD    CC:  Follow up    SUBJECTIVE:  Pt wanting to clean. Asking to go home. Confused at times.      OBJECTIVE:  Temp:  [97.6 °F (36.4 °C)-98.9 °F (37.2 °C)] 97.8 °F (36.6 °C)  Pulse:  [67-91] 91  Re 0.060* 0.061* 0.057*   PBNP 2,541*  --   --        Creatinine Kinase  Recent Labs   Lab 05/22/20  1956   CK 48       Inflammatory Markers  Recent Labs   Lab 05/23/20  0446 05/24/20  0546 05/25/20  0503   CRP 2.13* 2.31* 1.41*   ANJANA 75.9 66.1 61.0    exacerbation of residual symptoms secondary to infectious process above  -CT brain without acute issue  -continue asa, statin     ** new onset afib with RVR (RVR resolved)  -cards on consult, appreciate  -on heparin gtt--> per cards no plans for long term

## 2020-05-26 NOTE — PHYSICAL THERAPY NOTE
PHYSICAL THERAPY QUICK EVALUATION - INPATIENT    Room Number: 373/373-A  Evaluation Date: 5/26/2020  Presenting Problem: [address]  Physician Order: PT Eval and Treat    Problem List  Principal Problem:    Atrial fibrillation with rapid ventricular respons AM-PAC '6-Clicks' INPATIENT SHORT FORM - BASIC MOBILITY  How much difficulty does the patient currently have. ..  -   Turning over in bed (including adjusting bedclothes, sheets and blankets)?: A Lot   -   Sitting down on and standing up from a chair Based on this evaluation, patient's clinical presentation is stable and overall evaluation complexity is considered low. Pt currently requires 1 person assist for transfers and bed mobility which Is baseline level of function.  Pt does not require further s

## 2020-05-26 NOTE — CM/SW NOTE
Call from pts daughter Rachel Ring. She is requesting pt go to [facility] for physical therapy prior to coming home. She needs more time to get caregivers in place as pts current caregivers have [address] [facility] is also ill with covid.      She states pt has been to [employer]

## 2020-05-27 NOTE — PLAN OF CARE
Patient very restless and anxious. Banging call light on side rails and wanting to walk around and clean. Patient is wheelchair bound. Patient reoriented and emotional support offered, Daughter phoned and spoke with patient on several occasions.  Patient re

## 2020-05-27 NOTE — PLAN OF CARE
Assumed pt care this morning. Alert, awake, confused. At times restless. Breathing unlabored. Tolerating po intake, poor appetite. Discharge planning.

## 2020-05-27 NOTE — CM/SW NOTE
Call to dtr Suzette Murphy [phone] to discuss paz options, only [facility] can accept patient, dtr has no preference on location. Updates sent via aidin to pursue insurance auth.     Discussed options of respite care vs returning

## 2020-05-27 NOTE — PROGRESS NOTES
INFECTIOUS DISEASE PROGRESS NOTE    Lyric Riley Patient Status:  Inpatient    1923 MRN [id number]   [facility] 3SW-A Attending Salvatore Harada, *   Hosp Day # 4 PCP Mendel Rand GFRAA 85 92  --  94   GFRNAA 74 80  --  81   CA 9.1 8.2*  --  8.5   ALB 3.2* 2.4*  --   --    * 139  --  137   K 3.5 3.5 5.0 4.3   CL 96* 106  --  107   CO2 28.0 28.0  --  27.0   ALKPHO 126 99  --   --    AST 27 21  --   --    ALT 18 12*  --   -- Noncontrast CT scanning is performed through the brain. Dose reduction techniques were used. Dose information is transmitted to the [facility] [employer] of Radiology) NRDR ([address]) which includes the Dose Index Registry.   PATIENT STATED HISTORY:(As transcribed by Technologist)  Patient complains of shortness of breath for 4 weeks. Patient is covid positive. CONTRAST USED:  100cc of Omnipaque 350  FINDINGS:   VASCULATURE:  Negative for acute pulmonary embolism.   No filling defects Technologist)  Patient offered no additional history at this time.     FINDINGS:  Redemonstration of extensive bilateral parenchymal abnormalities, much of which likely reflects chronic lung disease including parenchymal and pleural fibrosis and architectur

## 2020-05-27 NOTE — PLAN OF CARE
RECEIVED PATIENT RESTING IN BED. CALLING OUT AT TIMES. RASS +1. PATIENT ORIENTED TO PERSON. ABLE TO COMFORT WITH WARM BLANKET. MEDS GIVEN WITH SIPS OF WATER. SR ON TELE. VIDEO MONITORING IN PLACE. UA COLLECTED AND RESULTS REVIEWED.   MELATONIN GIVEN

## 2020-05-27 NOTE — PROGRESS NOTES
[facility] Hospitalist Progress Note     PCP: Lonnie Byrd MD    CC:  Follow up    SUBJECTIVE:  More alert/oriented today. Asking when she can go home. Pleasant.      OBJECTIVE:  Temp:  [96.7 °F (35.9 °C)-98.1 °F (36.7 °C)] 97 °F (36.1 °C)  Pulse:  [71-91] 7 05/23/20  0114 05/23/20  0446   TROP 0.060* 0.061* 0.057*   PBNP 2,541*  --   --        Creatinine Kinase  Recent Labs   Lab 05/22/20 1956   CK 48       Inflammatory Markers  Recent Labs   Lab 05/23/20  0446 05/24/20  0546 05/25/20  0503   CRP 2.13* 2.31* with residual L sided weakness  -suspect exacerbation of residual symptoms secondary to infectious process above  -CT brain without acute issue  -continue asa, statin     ** new onset afib with RVR (RVR resolved)  -cards on consult, appreciate  -on heparin

## 2020-05-28 NOTE — PLAN OF CARE
RECEIVED PATIENT RESTING IN BED. AGITATED AND CALLING OUT/BANGING ON SIDE RAILS. RASS +2.  PO RISPERDAL GIVEN. PATIENT ORIENTED TO PERSON. MEDS GIVEN WITH SIPS OF WATER. SR ON TELE.  VSS.  VIDEO MONITORING IN PLACE.    WILL CONTINUE TO MONITOR

## 2020-05-28 NOTE — PROGRESS NOTES
INFECTIOUS DISEASE PROGRESS NOTE    Avila Gregg Patient Status:  Inpatient    1923 MRN [id number]   [facility] 3SW-A Attending Jessica Felix, *   Hosp Day # 5 PCP Bethanie Bertrand --  0.51*   GFRAA 85 92  --  94   GFRNAA 74 80  --  81   CA 9.1 8.2*  --  8.5   ALB 3.2* 2.4*  --   --    * 139  --  137   K 3.5 3.5 5.0 4.3   CL 96* 106  --  107   CO2 28.0 28.0  --  27.0   ALKPHO 126 99  --   --    AST 27 21  --   --    ALT 18 12* remains on RA    2. acute hypoxic resp failure due to above - resolved    3. Confusion- assume TME due to above, still confused at times  - follow MS  - no other obvious ongoing infection and PCT neg. UA clean, doubt intermittent confusion due to UTI    4.

## 2020-05-28 NOTE — PROGRESS NOTES
[facility] Hospitalist Progress Note     PCP: Lonnie Byrd MD    CC:  Follow up    SUBJECTIVE:  No acute events.  Awaiting DC plan with SW.    OBJECTIVE:  Temp:  [96.7 °F (35.9 °C)-98.2 °F (36.8 °C)] 98 °F (36.7 °C)  Pulse:  [75-85] 75  Resp:  [21-29] 26  BP 2,541*  --   --        Creatinine Kinase  Recent Labs   Lab 05/22/20  1956   CK 48       Inflammatory Markers  Recent Labs   Lab 05/23/20  0446 05/24/20  0546 05/25/20  0503   CRP 2.13* 2.31* 1.41*   ANJANA 75.9 66.1 61.0    208 215   DDIMER 0.75 0.56 symptoms secondary to infectious process above  -CT brain without acute issue  -continue asa, statin     ** new onset afib with RVR (RVR resolved)  -cards on consult, appreciate  -on heparin gtt--> per cards no plans for long term AC  -BB  -mildly elevated

## 2020-05-28 NOTE — PLAN OF CARE
Discharge order in place. Updated Cayetano Spray with social work. Awaiting discharge plan by social work/family.

## 2020-05-28 NOTE — OCCUPATIONAL THERAPY NOTE
OCCUPATIONAL THERAPY EVALUATION - INPATIENT     Room Number: 373/373-A  Evaluation Date: 5/28/2020  Type of Evaluation: Initial  Presenting Problem: COVID-19    Physician Order: IP Consult to Occupational Therapy  Reason for Therapy: ADL/IADL Dysfunction a functional limits    VISION  Current Vision: no visual deficits    PERCEPTION  Overall Perception Status:   WFL - within functional limits    SENSATION  Light touch:  intact    Communication: Pt is able to communicate all basic needs and wants    [employer] [facility] max assist x1, pt will require max assist x2 for toileting as pt was max assist x1 to stand and pt unable to doff brief nor complete anderson-care and required max assist for these.   Patient End of Session: Up in chair;Needs met;Call light within reach;RN awar evaluation; Compensatory technique education;Equipment eval/education;Patient/Family training;Patient/Family education; Endurance training;UE strengthening/ROM; Functional transfer training  Rehab Potential : Guarded  Frequency (Obs): 3x/week  Number of Visit

## 2020-05-28 NOTE — PLAN OF CARE
Assumed pt care this morning. Alert, awake, confused, remains restless at times. Encouraging po intake. Discharge planning continues.

## 2020-05-28 NOTE — PLAN OF CARE
Daughter Domo Calvert has agreed to take patient home with caregivers per . I updated Domo Calvert and reviewed discharge instructions/avs with her, verbalized understanding. Ambulance here to take patient home.  Avs/discharge paperwork given to them to take t

## 2020-05-28 NOTE — CM/SW NOTE
Received call from Pt's daughter Evelyne Cockayne [phone] requesting referral to [address] [place] [address], fax#[phone]. She prefers that pt returns home with [facility] rather that go to [facility]. Awaiting response from [facility], SW will follow.        Pt has been accepte

## 2020-05-28 NOTE — CM/SW NOTE
Received call from Pt's daughter Brigid Bertrand ([phone], who has arranged caregiver services and requested 6pm transport time.      SW arranged [facility] ambulance for 6pm. Pt has been accepted by [facility] [employer]) GS#[phone] and notified  of d/c to

## 2020-05-29 NOTE — DISCHARGE SUMMARY
General Medicine Discharge Summary     Patient ID:  Juancho Javed  80year old  6/16/1923    Admit date: 5/22/2020    Discharge date and time:  5/28/20    Attending Physician: No att. providers found score.  - pt has not required any risperidone PRN. Not agitated today. -repeat UA with neg nitrite, wbc wnl, rare bacteria - not c/w UTI, did not qualify for reflex UCx to be sent.  Pt does not report dysuria.      **increased L sided weakness in setting 05/28/20  1532   BP: 127/74   Pulse: 89   Resp: 24   Temp: 97.3 °F (36.3 °C)     Limited exam to reduce exposure/PPE     No acute distress  No conversational dyspnea  Speech clear       Total time coordinating care for discharge: > 30 minutes    [place]

## 2020-06-05 NOTE — PAYOR COMM NOTE
--------------  DISCHARGE REVIEW    Payor: [employer] MEDICARE ADV PPO  Subscriber #:  [id number]  Authorization Number: [id number]    Admit date: 5/23/20  Admit time:  0012  Discharge Date: 5/28/2020  6:52 PM     Admitting Physician: DO Nasima Figueredo [id number]  [phone]    Schedule an appointment as soon as possible for a visit in 1 week          Hospital Course:      79 yo with HTN/HL, reported hx of  Neurocysticercosis, CVA with residual L sided weakness is admitted for covid + and confusion.      ** Discharge Medication List as of 5/28/2020  6:21 PM    START taking these medications    metoprolol Tartrate 25 MG Oral Tab  Take 1 tablet (25 mg total) by mouth 2 (two) times daily. , Normal, Disp-60 tablet, R-1      CONTINUE these medications which hav

## 2020-08-11 PROBLEM — J18.9 COMMUNITY ACQUIRED PNEUMONIA OF RIGHT LUNG, UNSPECIFIED PART OF LUNG: Status: ACTIVE | Noted: 2020-01-01

## 2020-08-11 NOTE — ED PROVIDER NOTES
Patient Seen in: [facility] Emergency Department      History   Patient presents with:  Altered Mental Status    Stated Complaint: pt has had AMS    HPI    42-year-old female comes to the hospital with 24 hours of feeling a bit dizzy as well as naus normal. No murmurs, irregular rate and rhythm, tachycardia  Lungs: Bilateral crackles noted  Abdomen: Soft nontender nondistended normal active bowel sounds without rebound, guarding or masses noted  Back nontender without CVA tenderness  Extremity no club intervals and axes as noted on EKG Report.   Rate: 110  Rhythm: Atrial Fibrillation  Reading: Agreed              Ct Brain Or Head (16808)    Result Date: 8/11/2020  PROCEDURE:  CT BRAIN OR HEAD (78805)  COMPARISON:  [facility] , CT, CT BRAIN OR HEAD (01064), 5 radiograph was obtained.   COMPARISON:  [facility] , XR, XR CHEST AP PORTABLE  (CPT=71045), 5/22/2020, 8:26 PM.  [facility] , XR, XR CHEST PA + LAT CHEST (CPT=71046), 1/28/2020, 10:10 AM.  INDICATIONS:  pt has had AMS  PATIENT STATED HISTORY: (As transcribed by Luna want to start any anticoagulants secondary to age and history of frequent falls. Patient is feeling better while here with control of her heart rate.         MDM     As above        Critical Care Note:  The patient arrived with a condition with significant

## 2020-08-11 NOTE — ED INITIAL ASSESSMENT (HPI)
Pt brought in by [facility] for [facility]MS.  [facility] , no paperwork provided by EMS

## 2020-08-12 PROCEDURE — 99221 1ST HOSP IP/OBS SF/LOW 40: CPT | Performed by: INTERNAL MEDICINE

## 2020-08-12 NOTE — CONSULTS
[facility]  Report of Consultation    Agata Figueredo Patient Status:  Inpatient    1923 MRN [id number]   [facility] 2NE-A Attending Douglas Olmstead MD   Hosp Day # 1 PCP Eyad Perales MD     Reas (ZOFRAN-ODT) disintegrating tab 4 mg, 4 mg, Oral, Q6H PRN **OR** ondansetron HCl (ZOFRAN) injection 4 mg, 4 mg, Intravenous, Q6H PRN  •  amLODIPine Besylate (NORVASC) tab 2.5 mg, 2.5 mg, Oral, Daily  •  aspirin EC tab 81 mg, 81 mg, Oral, Daily  •  lisinopr Tin  8/12/2020  8:15 AM

## 2020-08-12 NOTE — PAYOR COMM NOTE
--------------  ADMISSION REVIEW     Payor: [employer] MEDICARE ADV PPO  Subscriber #:  [id number]  Authorization Number: [id number]    Admit date: 8/11/20  Admit time: 2357     Admitting Physician: Isai Benson MD  Attending Physician:  Isai Benson signs reviewed. All other systems reviewed and negative except as noted above.     Physical Exam     ED Triage Vitals [08/11/20 1722]   BP (!) 165/91   Pulse 107   Resp (!) 28   Temp 99 °F (37.2 °C)   Temp src Temporal   SpO2 94 %   O2 Device None (Room EKG    Rate, intervals and axes as noted on EKG Report. Rate: 110  Rhythm: Atrial Fibrillation  Reading: Agreed    Ct Brain Or Head (08836)  Result Date: 8/11/2020  CONCLUSION:  No acute intracranial process.        Xr Chest Ap Portable  (cpt=71045)  R procedures and care normally rendered for greater then 35 minutes of critical care time    Disposition and Plan     Clinical Impression:  Community acquired pneumonia of right lung, unspecified part of lung  (primary encounter diagnosis)  Atrial fibrillati 3-5x/week  Number of Visits to Meet Established Goals: 4     MEDICATIONS ADMINISTERED IN LAST 1 DAY:  amLODIPine Besylate (NORVASC) tab 2.5 mg     Date Action Dose Route User    8/12/2020 0917 Given 2.5 mg Oral Jonathan November M, RN      aspirin EC tab

## 2020-08-12 NOTE — OCCUPATIONAL THERAPY NOTE
OCCUPATIONAL THERAPY EVALUATION - INPATIENT     Room Number: 2607/2607-A  Evaluation Date: 8/12/2020  Type of Evaluation: Initial  Presenting Problem: Community acquired pneumonia of R lung    Physician Order: IP Consult to Occupational Therapy  Reason for bar  Shower/Tub and Equipment: Walk-in shower       Occupation/Status: retired  Hand Dominance: Right  Drives: No       Patient prior level of functioning: Pt reports assist from caregivers for dressing, bathing, toileting. Uses w/c for mobility.      Cody Hernandez Little  -   Putting on and taking off regular upper body clothing?: A Little  -   Taking care of personal grooming such as brushing teeth?: A Little  -   Eating meals?: None    AM-PAC Score:  Score: 19  Approx Degree of Impairment: 42.8%  Standardized Scor maximizing patient’s ability to return safely to her prior level of function.     Patient Complexity  Occupational Profile/Medical History LOW - Brief history including review of medical or therapy records    Specific performance deficits impacting engageme

## 2020-08-12 NOTE — ED NOTES
Report called to [address] [facility], room 2607 ready.  Pt to transport to room on monitor, w/RN

## 2020-08-12 NOTE — PROGRESS NOTES
08/12/20 0016 08/12/20 0019 08/12/20 0021   Vital Signs   /69 117/71 137/78   BP Location Right arm Right arm Right arm   BP Method Automatic Automatic Automatic   Patient Position Lying Sitting Standing

## 2020-08-12 NOTE — PROGRESS NOTES
08/12/20 1604 08/12/20 1606 08/12/20 1608   Vital Signs   Pulse 90 97 118   Heart Rate Source Monitor Monitor Monitor   Resp 20 20 20   Respiratory Quality Normal Normal Normal   /74 127/70 130/81   BP Location Left arm Left arm Left arm   BP Meth

## 2020-08-12 NOTE — H&P
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 , Disp: , Rfl:       loratadine 10 MG Oral Tab, Take 10 mg by mouth daily. , Disp: , Rfl: , 8/11/2020 at 0900  amLODIPine Besylate 2.5 MG Oral Tab, Take 2.5 mg by mouth daily. , Disp: , Rfl: , 8/11/2020 at 0900  lis deformity. Heart:  Regular rate and rhythm, S1, S2 normal, no murmur, rub or gallop appreciated   Abdomen:   Soft, non-tender. Bowel sounds normal. No masses,  No organomegaly.  Non distended   Extremities: Extremities normal, atraumatic, no cyanosis or e and subcortical white matter low attenuation consistent small vessel ischemic disease. There is no evidence of hemorrhage, mass lesion, or mass effect. SINUSES:           No sign of acute sinusitis.   MASTOIDS:          Chronic sclerosis noted within the m node in the right paratracheal region likely due to the infectious or inflammatory process of the lungs measuring 22 by 14 mm. ROXY:  No mass or adenopathy. CARDIAC:  No enlargement, pericardial thickening, or significant calcification.  PLEURA:  There is bilateral airspace opacities are again noted. There is likely a chronic component although some increased opacity of the right upper lobe is suspicious for an area of worsening pneumonia. If there is persistent clinical concern then  consider CT.     Dict Morena SMITH  [facility] Hospitalist  Pager [phone]  Answering Service number: [phone]

## 2020-08-12 NOTE — PROGRESS NOTES
Assumed care of patient at [facility] 3701 from ED RN. Patient AOX4, drowsy. Oxygenating >90% on RA. A-fib on tele. No anticoag per cards. Cardizem gtt turned off d/t rates sustained in 62s. Plan for IV abx. PT/OT to see.  Patient/family udpated on POC, will continue

## 2020-08-12 NOTE — PHYSICAL THERAPY NOTE
PHYSICAL THERAPY EVALUATION - INPATIENT     Room Number: 0415/7013-K  Evaluation Date: 8/12/2020  Type of Evaluation: Initial  Physician Order: PT Eval and Treat    Presenting Problem: A fib w/ RVR  Reason for Therapy: Mobility Dysfunction and Discha Level of [place]: Pt typically w/c bound, has caregiver assist for all adl's and transfers. Per dtr recent transfers at home have required assist of 2 to complete which is more than her previous baseline level.     SUBJECTIVE  \"I'm dizzy\"    Patient Assessment   Gait Assistance:  Moderate assistance  Distance (ft): 2  Assistive Device: Rolling walker  Pattern: Shuffle(Narrow ophelia, leaning posteriorly)  Stoop/Curb Assistance: Not tested       Skilled Therapy Provided: Therapist treating pt in surgical ma mobility, transfers, and limited gait activities. The patient is below baseline and would benefit from skilled inpatient PT to address the above deficits to assist patient in returning to prior to level of function.   DISCHARGE RECOMMENDATIONS  PT Discharg

## 2020-08-13 PROCEDURE — 99232 SBSQ HOSP IP/OBS MODERATE 35: CPT | Performed by: INTERNAL MEDICINE

## 2020-08-13 NOTE — PROGRESS NOTES
08/13/20 0930 08/13/20 0932 08/13/20 0933   Vital Signs   /82 138/69 (!) 119/102   BP Location Right arm Right arm Right arm   BP Method Automatic Automatic Automatic   Patient Position Lying Sitting Standing   Orthostatic blood pressure.  Feels di

## 2020-08-13 NOTE — PROGRESS NOTES
Assumed care at [address]. Patient sitting up in chair, Kyrgyz speaking only, this RN is able to communicate with her. IVF's infusing per order. Patient denies CP, SOB and dizziness.  Patient AO to self, confused and forgetful; patient up with x2 assist and wal

## 2020-08-13 NOTE — PAYOR COMM NOTE
--------------  CONTINUED STAY REVIEW    [employer] MED[employer] ADV P[employer]  Subscriber #:  [id number]  Authorization Number: [id number]    Admit date: 8/11/20  Admit time: 2357    Admitting Physician: Lisbet Gonzalez MD  Attending Physician:  Cony Xavier 0040 Given 30 mg Oral Bernardo Aiken RN    8/12/2020 1756 Given 30 mg Oral Bubba MARTÍNEZ RN      lisinopril tab 20 mg     Date Action Dose Route User    8/13/2020 0930 Given 20 mg Oral Mono Leonard RN      0.9% NaCl infusion     Date Action Dose

## 2020-08-13 NOTE — PROGRESS NOTES
[facility]  Cardiology Progress Note    Subjective:  No chest pain or shortness of breath.     Objective:  /62 (BP Location: Right arm)   Pulse 64   Temp 97.7 °F (36.5 °C) (Oral)   Resp 18   Ht 5' 4\" (1.626 m)   Wt 125 lb (56.7 kg)   SpO2 92%

## 2020-08-13 NOTE — CM/SW NOTE
08/13/20 1400   CM/SW Screening   Referral Source Social Work (self-referral)   [facility] review;Nursing rounds   Patient's Mental Status Memory Impairments   Patient lives with Caregiver   Patient Status Prior to Admission   In

## 2020-08-13 NOTE — HOME CARE LIAISON
Received referral from [place]. Attempted to call daughter to discuss [facility], unable to reach. Message left. Awaiting return call. Addendum 8/14: Received call back from daughter, Unknown [place].   Daughter stating that she wants her mother to go to rehab and r

## 2020-08-13 NOTE — PROGRESS NOTES
[facility] Pharmacy Note: Route Optimization for Azithromycin [facility])    Patient is currently on Azithromycin (ZITHROMAX) 250 mg IV every 24 hours.    The patient meets the criteria to convert to the oral equivalent as established by the IV to Oral conversion

## 2020-08-13 NOTE — PLAN OF CARE
Received patient on bed, sleeping. Denied chest pain, denied SOB. Discussed POC. Due meds given. Safety measures reinforced, call light within reach. Call light within reach. Needs attended to. Will continue to monitor.     Problem: Impaired Activities of D

## 2020-08-13 NOTE — PROGRESS NOTES
[facility] Hospitalist Progress Note     PCP: Raulito Calvin MD    SUBJECTIVE:  No complaints  Dizziness is mostly resolved  No vision changes  + orthostatics from sit to stand    Dc MRI Brain  AFib rate controlled    OBJECTIVE:  Temp:  [97.6 °F (36.4 °C)-98 0. 60 0.51*   CA 8.5 8.7   GLU 96 94       Recent Labs   Lab 08/11/20  1737   ALT 15   AST 17   ALB 3.0*       Recent Labs   Lab 08/11/20  1735   PGLU 93       Recent Labs   Lab 08/11/20  1737   [facility].045     [facility]       Imaging:      Meds:     • bedside.       Thank Jessica Adames MD  [facility] Hospitalist  [phone]

## 2020-08-14 PROCEDURE — 99232 SBSQ HOSP IP/OBS MODERATE 35: CPT | Performed by: INTERNAL MEDICINE

## 2020-08-14 NOTE — CM/SW NOTE
[address] from [facility] states that daughter wants [facility]. SW spoke to daughter. She states pt needs to be stronger to come home. Pt will need PT/OT to re-eval for [facility]. Pt has caregiver 8 hours per day but daughter states they need 2 people to transfer her.   Ref

## 2020-08-14 NOTE — PLAN OF CARE
Assumed pt care at 1530  VSS  Pt denies pain  Pt denies dizziness  Chair alarm on  POC discussed with pt and daughter  Will continue to monitor      Problem: Impaired Activities of Daily Living  Goal: Achieve highest/safest level of independence in self ca

## 2020-08-14 NOTE — PROGRESS NOTES
[facility]    Electrophysiology Follow Up Note      [facility] Patient Status:  Inpatient    1923 MRN [id number]   [facility] 2NE-A Attending Kaley Ford MD   Hosp Day # 3 PCP Pedrito Russo MD     [facility] edema  Skin: Warm and dry.        Labs:     Recent Labs   Lab 08/11/20  1737 08/12/20  0754   GLU 96 94   BUN 9 8   CREATSERUM 0.60 0.51*   GFRAA 88 93   GFRNAA 77 81   CA 8.5 8.7    139   K 3.5 3.9    107   CO2 30.0 30.0       Recent Labs   Lab

## 2020-08-14 NOTE — CM/SW NOTE
PT does advise [facility]. Discussed accepting facilites with daughter Petra Pressley [phone]. She had questions re: visitation. Sent messages to facilities to inquire about visitation. Will update daughter once facilities respond.     Aaron Leach, LCSW  Social W

## 2020-08-14 NOTE — PROGRESS NOTES
08/14/20 1544 08/14/20 1546 08/14/20 1558   Vital Signs   Pulse 74 70 89   Heart Rate Source  --   --  Monitor   Resp  --   --  18   Respiratory Quality  --   --  Normal   /74 121/74 121/84   BP Location Left arm Left arm Left arm   BP Method Auto

## 2020-08-14 NOTE — PLAN OF CARE
Pt alert and oriented x3. On RA. Denies any SOB or chest pain. A fib on tele. Pt c/o headache overnight. Prn tylenol given with relief. Continent to bowel and bladder.  Malena Farooq in place d/t pt having +ortho's and attempting to get out of bed on her own ove

## 2020-08-14 NOTE — PAYOR COMM NOTE
--------------  CONTINUED STAY REVIEW    [facility] MEDICARE ADV PPO  Subscriber #:  [id number]  Authorization Number: [id number]    Admit date: 8/11/20  Admit time: 2357    Admitting Physician: Jonas Dowd MD  Attending Physician:  Julita Aiken Lali Evans RN      aspirin EC tab 81 mg     Date Action Dose Route User    8/14/2020 0911 Given 81 mg Oral Desmond Santos RN      dilTIAZem HCl (CARDIZEM) tab 30 mg     Date Action Dose Route User    8/13/2020 1343 Given 30 mg Oral Miles Alberts RN

## 2020-08-14 NOTE — PLAN OF CARE
Patient sitting up in chair, denies CP, SOB and dizziness. IVF infusing per order. Patient German speaking, this RN able to communicate with patient. Patient alert and oriented to self and place, confused and forgetful at times, left side weakness;  Afib o

## 2020-08-15 NOTE — PLAN OF CARE
Pt denies c/o pain, malaise, or cardiac symptoms. A&Ox2, oriented to person and place, forgetful, relieved with rest. Lungs with crackles bilaterally, equal expansion, on room air. Pt afib on monitor, rates controlled.  Abdomen soft and non-tender with acti signs, obtain 12 lead EKG if indicated  - Evaluate effectiveness of antiarrhythmic and heart rate control medications as ordered  - Initiate emergency measures for life threatening arrhythmias  - Monitor electrolytes and administer replacement therapy as o resources and transportation as appropriate  - Identify discharge learning needs (meds, wound care, etc)  - Arrange for interpreters to assist at discharge as needed  - Consider post-discharge preferences of patient/family/discharge partner  - Complete BILLY

## 2020-08-15 NOTE — PROGRESS NOTES
[facility] Hospitalist Progress Note     [facility]      SUBJECTIVE:  Feeling ok  No CP or SOB    OBJECTIVE:  Temp:  [97.4 °F (36.3 °C)-98.2 °F (36.8 °C)] 98 °F (36.7 °C)  Pulse:  [] 88  Resp:  [16-20] 20  BP: (116-138)/(72-84) 138/77    Intake/Outpu consistent with atrophy. INTRACRANIAL:  Multiple calcifications throughout the brain parenchyma likely sequela from prior infectious disease such as neurocysticercosis.   There is an old area of encephalomalacia change in the right medial frontal lobe consi upper lobes and superior segment of the lower lobes. There are interstitial linear opacities consistent with interlobular septal thickening and interlobular septa. Findings do not appear to be significantly changed since 5/22/2020.   Patient does have a h Low lung volumes limit assessment. Patchy bilateral airspace opacities are noted throughout the lungs. This is similar to the prior exam but possibly slightly progressed. Some of this may be related to the low lung volumes.   The lung apices  demonstrat weakness, acute exacerbation  - Assess for infectious etiology: UA negative, CT chest w GGO, unclear if new or old, no rash and no abdominal pain  - Metabolic panel wnl  - Normal procal, ESR, CRP, dc ceftriaxone and azithromycin as low s/f PNA     # AFib w

## 2020-08-15 NOTE — PLAN OF CARE
Patient sitting up in chair, denies CP, SOB and dizziness. Patient alert and oriented to self and place, forgetfult; Afib on cardiac monitor; lungs with crackles bilaterally, RA, no cough noted; no edema noted; patient up with x2 assist and walker.  Plan fo

## 2020-08-16 NOTE — PLAN OF CARE
Patient laying in bed, denies CP, SOB and dizziness. Patient AOx2, confused and forgetful at times;  Afib on cardiac monitor; lungs with crackles bilaterally, RA, non productive cough noted; patient up with x2 assist and walker; abdomen with bowel sounds ro

## 2020-08-16 NOTE — PROGRESS NOTES
[facility] Hospitalist Progress Note     [facility]      SUBJECTIVE:  Constipated  No N/V    OBJECTIVE:  Temp:  [97.7 °F (36.5 °C)-98.7 °F (37.1 °C)] 97.8 °F (36.6 °C)  Pulse:  [] 102  Resp:  [16-18] 18  BP: (115-144)/(63-82) 119/63    Intake/Output: atrophy. INTRACRANIAL:  Multiple calcifications throughout the brain parenchyma likely sequela from prior infectious disease such as neurocysticercosis.   There is an old area of encephalomalacia change in the right medial frontal lobe consistent with an ol superior segment of the lower lobes. There are interstitial linear opacities consistent with interlobular septal thickening and interlobular septa. Findings do not appear to be significantly changed since 5/22/2020.   Patient does have a history of covert volumes limit assessment. Patchy bilateral airspace opacities are noted throughout the lungs. This is similar to the prior exam but possibly slightly progressed. Some of this may be related to the low lung volumes.   The lung apices  demonstrate somewhat falls p/w dizziness and generalized weakness     #  Dizziness: likely 2/2 dehydration  - CT head negative and CT A neck w no acute abnormalitis  - continue Meclizine  - Orthostatics positive, resolved w IVF  - Dc IVF  - PT/OT:  [facility], final disposition plan

## 2020-08-16 NOTE — PLAN OF CARE
Assumed care of patient 8/15/2020. Pt here w/SOB, recent COVID infection. Patient is alert, oriented x4. Hx of stroke w/residual (L) side weakness - slight weakness noted but otherwise weak throughout. O2 sats > 94% on RA. Lungs DIM w/crackles.  AFIB on tel Progressing  Goal: Absence of cardiac arrhythmias or at baseline  Description  INTERVENTIONS:  - Continuous cardiac monitoring, monitor vital signs, obtain 12 lead EKG if indicated  - Evaluate effectiveness of antiarrhythmic and heart rate control medicati Identify barriers to discharge w/pt and caregiver  - Include patient/family/discharge partner in discharge planning  - Arrange for needed discharge resources and transportation as appropriate  - Identify discharge learning needs (meds, wound care, etc)  -

## 2020-08-17 NOTE — PHYSICAL THERAPY NOTE
PHYSICAL THERAPY TREATMENT NOTE - INPATIENT    Room Number: 2607/2607-A     Session: 1     Number of Visits to Meet Established Goals: 3    Presenting Problem: A fib w/ RVR    History related to current admission: Pt is 80year old female admitted on 8/ ASSESSMENT   Ratin  Location: denies       BALANCE                                                                                                                       Static Sitting: Fair -  Dynamic Sitting: Poor +           Static Standing: Poor  Dy EOB. Return to supine to rest.   Supine thera ex as below. T/f to EOB. Sitting x 3 min with c/o dizziness. BP ok. Returned to supine. Returned to sit EOB, continuing c/o dizziness. Sit<>stand and t/f to b/s chair as above.    Pt encouraged to stay i Goal #5     Goal #6     Goal Comments: Goals established on 8/12/2020 8/17/2020 all goals ongoing. Therapist in surgical mask and gloves. Patient in no PPE. Therapist accompanied by no one.

## 2020-08-17 NOTE — PLAN OF CARE
Patient tele D/C, IV discontinued with catheter intact. Patient denies CP, SOB, dizziness, or palpitations. Patient denies calf tenderness. Patient discharge instructions reviewed with patient, verbalize understanding.  Patient medication and their side eff perfusion - ex.  Angina  - Evaluate fluid balance, assess for edema, trend weights  8/17/2020 1557 by Jeannine Sethi, RN  Outcome: Adequate for Discharge  8/17/2020 1449 by Jeannine Sethi RN  Outcome: Progressing  Goal: Absence of cardiac arrhythmias or at Instruct pt to call for assistance with activity based on assessment  - Modify environment to reduce risk of injury  - Provide assistive devices as appropriate  - Consider OT/PT consult to assist with strengthening/mobility  - Encourage toileting schedule strategies to overcome with those who interact with patient  8/17/2020 1557 by Jose Hunt RN  Outcome: Adequate for Discharge  8/17/2020 1449 by Jose Hunt RN  Outcome: Progressing

## 2020-08-17 NOTE — PROGRESS NOTES
[facility] Hospitalist Progress Note     [facility]      SUBJECTIVE:  Feeling ok  Tired  Denies SOB or CP, no N/V    OBJECTIVE:  Temp:  [97.8 °F (36.6 °C)-98.5 °F (36.9 °C)] 98 °F (36.7 °C)  Pulse:  [] 81  Resp:  [16-18] 18  BP: (109-132)/(63-83) 132 mental status. FINDINGS:  VENTRICLES/SULCI:  Ventricles and sulci are mildly prominent consistent with atrophy.  INTRACRANIAL:  Multiple calcifications throughout the brain parenchyma likely sequela from prior infectious disease such as neurocysticercosi dissection. LUNGS:  There is patchy areas of ground-glass opacity within the bilateral upper lobes and superior segment of the lower lobes. There are interstitial linear opacities consistent with interlobular septal thickening and interlobular septa.   Fi HISTORY: (As transcribed by Technologist)   Pt brought in by [facility] for [facility]. FINDINGS:  Low lung volumes limit assessment. Patchy bilateral airspace opacities are noted throughout the lungs.   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

## 2020-08-17 NOTE — PAYOR COMM NOTE
--------------  CONTINUED STAY REVIEW    [employer] MEDIC[employer] ADV PPO  Subscriber #:  [id number]  Authorization Number: [id number]    Admit date: 8/11/20  Admit time: 2357    Admitting Physician: John Paul Paulino MD  Attending Physician:  John Paul Paulino, Dereje Record

## 2020-08-17 NOTE — CM/SW NOTE
Donna Adkins with [facility] [address] [facility] [address] advised that they do have auth for [facility] and can accept pt today. Unit RN updated. SW will coordinate transfer. SW s/w daughter Catracho Farley to confirm d/c plans for [facility] [address] today.  [facility]anged [facility] fo

## 2020-08-17 NOTE — PLAN OF CARE
A/o x4, forgetful at times with some periods of confusion. Afib on tele, HR 80s-90s. WNL on RA. Denies any pain or SOB at this time. Reynoso Codding in place for periods of incontinence. Plan of care discussed with pt, verbalized understanding.  Call light within and heart rate control medications as ordered  - Initiate emergency measures for life threatening arrhythmias  - Monitor electrolytes and administer replacement therapy as ordered  Outcome: Progressing     Problem: PAIN - ADULT  Goal: Verbalizes/displays a (meds, wound care, etc)  - Arrange for interpreters to assist at discharge as needed  - Consider post-discharge preferences of patient/family/discharge partner  - Complete POLST form as appropriate  - Assess patient's ability to be responsible for managing

## 2020-08-17 NOTE — PLAN OF CARE
Patient laying in bed, denies CP, SOB and dizziness. VSS. Patient AOx4, forgetful; Afib on cardiac monitor; lungs with crackles bilaterally, RA, non productive cough, oxygen saturations 93% on RA; no edema noted; Patient up with x2 assist and walker.  Plan

## 2020-08-18 NOTE — DISCHARGE SUMMARY
General Medicine Discharge Summary     Patient ID:  Gerardo Shepard  80year old  6/16/1923    Admit date: 8/11/2020    Discharge date and time: 8/17/2020  3:59 PM     Attending Physician: Kike Barlow MD     Primary Care Physician: Estela Gorman MD PM    START taking these medications    docusate sodium 100 MG Oral Cap  Take 100 mg by mouth 2 (two) times daily. , Print Script, Disp-60 capsule, R-0    PEG 3350 17 g Oral Powd Pack  Take 17 g by mouth daily. , Print Script, Disp-30 each, R-0    [email or web address]

## 2020-08-28 NOTE — PAYOR COMM NOTE
--------------  DISCHARGE REVIEW    Payor: [employer]  Subscriber #:  [id number]  Authorization Number: [id number]    Admit date: 8/11/20  Admit time:  2357  Discharge Date: 8/17/2020  3:59 PM     Admitting Physician: Keron Trinidad MD  Attend disimpacted yesterday with much improvement  - will continue stool softeners, will need to continue as outpatient to prevent severe constipation     # AFib w RVR  - Tele  - Cards consult: change to diltiazem 120 ER w appropriate control     # HTN  - cnt di understand and agree with therapeutic plan as outlined above.      Thank Reyna Miller MD            Electronically signed by Librado King MD on 8/18/2020  7:08 AM         REVIEWER COMMENTS

## (undated) NOTE — ED AVS SNAPSHOT
Paulino Duvall   MRN: [id number]    Department:  [facility] Emergency Department   Date of Visit:  11/24/2017           Disclosure     Insurance plans vary and the physician(s) referred by the ER may not be covered by your plan.  Please contact your tell this physician (or your personal doctor if your instructions are to return to your personal doctor) about any new or lasting problems. The primary care or specialist physician will see patients referred from the [facility] Emergency Department.  Matias Cervantes

## (undated) NOTE — ED AVS SNAPSHOT
Myles Patricia   MRN: [id number]    Department:  [facility] Emergency Department   Date of Visit:  11/13/2017           Disclosure     Insurance plans vary and the physician(s) referred by the ER may not be covered by your plan.  Please contact your If you have been prescribed any medication(s), please fill your prescription right away and begin taking the medication(s) as directed    If the emergency physician has read X-rays, these will be re-interpreted by a radiologist.  If there is a significant

## (undated) NOTE — IP AVS SNAPSHOT
[address]            (For Outpatient Use Only) Initial Admit Date: 5/22/2020   Inpt/Obs Admit Date: Inpt: 5/23/20 / Obs: N/A   Discharge Date:    Skip Derick:  [de-identified]   MRN: [de-identified]   CSN: [id number]   CEID: [id number] Hospital Account Financial Class: Medicare Advantage    May 28, 2020

## (undated) NOTE — IP AVS SNAPSHOT
Patient Demographics     Address  [address] [place] [address] Phone  [phone] [facility]  [phone] [facility] E-mail Address  [email or web address]      Emergency Contact(s)     Name Relation Home Work Mobile    Melodie Flores Next dose due: May resume per your home schedule on 05/18/19       Take 100 mg by mouth daily. FISH OIL + D3 OR  Next dose due: May resume per your home schedule on 05/18/19       Take 1 capsule by mouth daily.           Loratadine 10 MG Tbdp  Ne [id number] Piperacillin Sod-Tazobactam So (ZOSYN) 3.375 g in dextrose 5 % 100 mL ADD-vantage 05/17/19 0549 New Bag      [id number] Piperacillin Sod-Tazobactam So (ZOSYN) 3.375 g in dextrose 5 % 100 mL ADD-vantage 05/17/19 1357 New Bag  Confirmed with pharma Final result   Ordering provider:  Lillian Winter MD  05/16/19 9136 Resulting lab:  [facility]    Specimen Information    Type Source Collected On   Blood — 05/17/19 3847          Components    Component Value Reference Range Flag Lab   Potassium 4.0 3.5 - daughters are present in the room and they state that patient looks weaker than usually. She has been complaining of chills but no fever and mild cough. [OO.2]    Past Medical History:  Past Medical History:   Diagnosis Date   • History of breast cancer hydrocortisone 10 MG Oral Tab Take 10 mg by mouth 2 (two) times daily. Disp:  Rfl:        Review of Systems:   A comprehensive 14 point review of systems was completed. Pertinent positives and negatives noted in the HPI.     Physical Exam:    /60 ( -Continue antibiotics at emergency room  -Pulmonary toilet  2. Dehydration continue IV fluids and reevaluate in the morning[OO.2]    Quality:  · DVT Prophylaxis:[OO.1] scd[OO. 2]  · CODE status:[OO.1] full[OO. 2]  · Jamison:[OO.1] no[OO. 2]    Plan of care dis Reason for Consultation: Impaired ADL and mobility dysfuction due to Acute R Frontal CVA  History of present illness:  Records reviewed, and patient examined. Consult Requested by: Marcy Olson    PT is a 80year old female with PMH of OA and who presents On Reg/thins. Physiatry consult obtained now to assess pt's funtional status and make appropriate recommendations. Premorbid Functional Status: Patient was independent with mobility/ambulation, transfers, ADL's, IADL's.   Support System and Family Circu FLEET ENEMA (FLEET) 7-19 GM/118ML enema 133 mL 1 enema Rectal Once PRN   ondansetron HCl (ZOFRAN) injection 4 mg 4 mg Intravenous Q6H PRN   Or      Metoclopramide HCl (REGLAN) injection 10 mg 10 mg Intravenous Q8H PRN   famoTIDine (PEPCID) tab 20 mg 20 mg  05/15/2019    K 3.6 05/15/2019     05/15/2019    CO2 28.0 05/15/2019    GLU 96 05/15/2019    CA 7.9 05/15/2019    PGLU 99 05/15/2019       Review of Systems:  ROS as listed in HPI   Other 10 point review of systems within normal limits.     OB negative bilaterally. Callejas's negative bilaterally. Finger to nose and heel to shin testing is normal bilaterally. Psychiatric: Awake, alert and oriented x 3. Physical Therapy Note signed by Abdulaziz Dodd PT at 5/16/2019  2:47 PM  Version 1 of 1    Author:  Abdulaziz Dodd PT Service:  Rehab Author Type:  Physical Therapist    Filed:  5/16/2019  2:47 PM Date of Service:  5/16/2019 12:01 PM Status:  Luiza Harris unspecified site        Past Surgical History  Past Surgical History:   Procedure Laterality Date   • CATARACT      erika   • CHOLECYSTECTOMY     • HIP TOTAL REPLACEMENT Right 5/1/2015    Performed by Oswaldo Rodríguez MD at [facility] Gait Assistance: Not tested                   Skilled Therapy Provided:[EP.1] Pt performed supine to sit with max A x 2. Pt able to sit at EOB x 20 minutes to participate in sitting balance activities.  Pt able to perform A/P, R/L WS with assistance though function. Highly recommend acute rehab at [facility] for intensive and comprehensive therapies to maximize function and mobility prior to safe return home.[EP.2]      DISCHARGE RECOMMENDATIONS  PT Discharge Recommendations: Acute rehabilitation     PLAN  PT Treatme to pneumonia. Pt transferred to [address] [place] [address] on 5/14/19 after weakness reported L side. Per imaging, pt with B frontal lobe infarcts[EP.2]. Brain MRI:  Impression:   CONCLUSION:      1. Suboptimal exam due to extensive patient motion as above.      2. Focal area Pt pleasant, motivated-[place][EP.3]    Patient self-stated goal is[EP.1] get better[EP.3]    OBJECTIVE  Precautions: Bed/chair alarm(L side inattention,  to 180)  Fall Risk: High fall risk    WEIGHT BEARING RESTRICTION  Weight Bearing Restriction: None Skilled Therapy Provided:[EP.1] Pt performed rolling to left and right with max A x 1. Pt performed supine to sit with max A x 2.  Pt able to sit at EOB x 10 minutes with max-total A for maintaining static sitting balance, Heavy lean to left from midline, p is below baseline and would benefit from skilled inpatient PT to address the above deficits to assist patient in returning to prior to level of function. [EP.1]Highly recommend acute rehab at [facility] for intensive and comprehensive therapies to maximize function through 5/17/2019  2:42 PM)      Occupational Therapy Note signed by Evleyne Michele OT at 5/17/2019  2:28 PM  Version 1 of 1    Author:  Evelyne Michele OT Service:  — Author Type:  Occupational Therapist    Filed:  5/17/2019  2:28 PM Date of Serv • CATARACT      erika   • CHOLECYSTECTOMY     • HIP TOTAL REPLACEMENT Right 5/1/2015    Performed by Rashel Heaton MD at [facility] MAIN OR   • OTHER SURGICAL HISTORY      right breast lumpectomy   • OTHER SURGICAL HISTORY Right 05/01/2015    YASH           OBJECTI living, rest and sleep, work, leisure and social participation. Pt will benefit from skilled OT services to maximize independence with ADLs.   Recommend discharge to an inpatient rehabilitation facility where pt can participate in a comprehensive and i Occupational Therapy Note signed by Caro Deluca OT at 5/17/2019  7:46 AM  Version 1 of 1    Author:  Caro Deluca OT Service:  Emily Eastman Author Type:  Occupational Therapist    Filed:  5/17/2019  7:46 AM Date of Service:  5/16/2019  2:32 PM Status: Procedure Laterality Date   • CATARACT      erika   • CHOLECYSTECTOMY     • HIP TOTAL REPLACEMENT Right 5/1/2015    Performed by Michaela Cunningham MD at [facility] MAIN OR   • OTHER SURGICAL HISTORY      right breast lumpectomy   • OTHER SURGICAL HISTORY Right 05/01/2 with total A for dynamic sitting balance. L shoulder AROM 90 degrees. Used foam block and exercise ball to encourage L hand ROM and strength.   Used video .[MS.2]   Patient End of Session: In bed;Needs met;Call light within reach;RN aware of ses Sit at edge of the bed for 2 minutes with max A x 1 in order to prepare for seated ADL. UE Exercise Program Goal  Patient will be modified independent with left AAROM HEP (home exercise program). Additional Goals:  Complete PHQ 9.   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       5. No obvious major intracranial arterial occlusion on the basis of the motion limited exam.                Problem List  Principal Problem:    Sepsis due to pneumonia Communication: clear speech    Behavioral/Emotional/Social: motivated    RANGE OF MOTION AND STRENGTH ASSESSMENT  Upper extremity ROM is within functional limits except for the following:  Left Shoulder flexion about 45 degrees  Left Elbow flexion full  Le cueing, unable to turn head to L side. Educated the pt and family about total lift use. RN and PCT aware. Educated the family about plan of care. Granddaughter's wedding is this Saturday.   Informed family that pt is at high risk of falling from the wheel Overall Complexity MODERATE     OT Discharge Recommendations: Acute rehabilitation       PLAN  OT Treatment Plan: Balance activities; Energy conservation/work simplification techniques;ADL training;Visual perceptual training;Functional transfer training;UE Author:  KAREN Clifton Service:  Rehab Author Type:  SPEECH-LANGUAGE PATHOLOGIST    Filed:  5/17/2019 12:07 PM Date of Service:  5/17/2019 11:38 AM Status:  Signed    :  KAREN Clifton (SPEECH-LANGUAGE PATHOLOGIST)       SPEECH/LANG Discharge Recommendations/Plan: Acute rehabilitation    Patient Experiencing Pain: No[CJ. 1]           GOALS  Goal #1 The patient will tolerate regular consistency and thin liquids without overt signs or symptoms of aspiration with 95 % accuracy over 2 sess Disussed with other staff    Patient, family and/or caregiver has been informed and has taken part in this evaluation and plan of treatment and have been advised and agree on the findings and goals.       FOLLOW UP  Treatment Plan/Recommendations: Caridad

## (undated) NOTE — IP AVS SNAPSHOT
[address]            (For Outpatient Use Only) Initial Admit Date: 5/13/2019   Inpt/Obs Admit Date: Inpt: 5/13/19 / Obs: N/A   Discharge Date:    Kaitlyn Saravia:  [de-identified]   MRN: [de-identified]   CSN: [id number]   CEID: [id number] Hospital Account Financial Class: Medicare Advantage    May 17, 2019

## (undated) NOTE — IP AVS SNAPSHOT
[address]            (For Outpatient Use Only) Initial Admit Date: 8/11/2020   Inpt/Obs Admit Date: Inpt: 8/11/20 / Obs: N/A   Discharge Date:    Miguel Toledo:  [de-identified]   MRN: [de-identified]   CSN: [id number]   CEID: [id number] Group Number:  Insurance Type:    Subscriber Name:  Subscriber :    Subscriber ID:  Pt Rel to Subscriber:    Hospital Account Financial Class: Medicare Advantage    2020

## (undated) NOTE — IP AVS SNAPSHOT
Patient Demographics     Address  [address] [place] [address] [address] [facility] [address] Phone  [phone] [facility])  [phone] (Mobile) *Preferred* E-mail Address  [email or web address]      Emergency Contact(s)     Name Relation Home Work Veronica Your medication list      TAKE these medications       Instructions Authorizing Provider Morning Afternoon Evening As Needed   aspirin 81 MG Tbec      Take 1 tablet (81 mg total) by mouth daily.    Rosa Elena Vogel DO   [    ]   [    ]   [    ]   [    ] Order ID Medication Name Action Time Action Reason Comments    [id number] Hydrocortisone (ANUSOL-HC) 2.5 % rectal cream 08/16/20 2024 Given      [id number] Hydrocortisone (ANUSOL-HC) 2.5 % rectal cream 08/17/20 0914 Given      [id number] PEG 3350 (MIRALAX) p Glucose 101 70 - 99 mg/dL H [facility])   Sodium 139 136 - 145 mmol/L — [facility])   Potassium 4.0 3.5 - 5.1 mmol/L [facility])   Chloride 103 98 - 112 mmol/L — [facility])   CO2 33.0 21.0 - 32.0 mmol/L H [facility] ([facility])   Anion Gap Blood Culture Result No Growth 5 Days    Blood Culture FREQ X 2 [[id number]] Collected:  08/11/20 1737    Order Status:  Completed Lab Status:  Final result Updated:  08/16/20 1800    Specimen:  Blood,peripheral      Blood Culture Result No Growth 5 Days • Stroke [facility]) 2018        [address]  Past Surgical History:   Procedure Laterality Date   • CATARACT      erika   • CHOLECYSTECTOMY     • HIP TOTAL REPLACEMENT Right 5/1/2015    Performed by Oswaldo Rodríguez MD at [facility] MAIN OR   • OTHER SURGICAL HISTORY      right b • Cancer Brother        Review of Systems  Comprehensive ROS reviewed and negative except for what's stated above. Including negative for chest pain, shortness of breath, syncope. OBJECTIVE:  /70 (BP Location: Left arm)   Pulse 100   Temp 98. 2 PROCEDURE:  CT BRAIN OR HEAD (65989)  COMPARISON:  [facility] , CT, CT BRAIN OR HEAD ([id number]), 5/13/2019, 9:26 PM.  [facility] , CT, CT BRAIN OR HEAD ([id number]), 5/22/2020, 9:52 PM.  INDICATIONS:  pt has had AMS  TECHNIQUE:  Noncontrast CT scanning is performed throug performed through the pulmonary arterial anatomy. 3D volume renderings are generated. Dose reduction techniques were used.  Dose information is transmitted to the ACR ([facility]) [address]) which includes th of the pneumonia with scarring and/or fibrosis. This is not significantly changed since 5/22/2020.     Dictated by (CST): Antonio Gonzalez MD on 8/11/2020 at 10:06 PM     Finalized by (CST): Antonio Gonzalez MD on 8/11/2020 at 10:13 PM       Xr Chest Ap Portable · Orthostatics ordered  · IVF  · PT/OT[CS.2]    #[CS. 1] Generalized Weakness  · Chronic L side weakness, acute exacerbation  · Assess for infectious etiology: UA negative, CT chest w GGO, unclear if new or old, no rash and no abdominal pain  · Metabolic pa [facility]  Report of Consultation    Alberto Joseph Patient Status:  Inpatient    1923 MRN [id number]   [facility] 2NE-A Attending Delmy León MD   Hosp Day # 1 PCP Jeff Stafford MD     Reason for [facility] •  ondansetron (ZOFRAN-ODT) disintegrating tab 4 mg, 4 mg, Oral, Q6H PRN **OR** ondansetron HCl (ZOFRAN) injection 4 mg, 4 mg, Intravenous, Q6H PRN  •  amLODIPine Besylate (NORVASC) tab 2.5 mg, 2.5 mg, Oral, Daily  •  aspirin EC tab 81 mg, 81 mg, OralRemy Jan Castle  8/12/2020  8:15 AM[MS.1]      Electronically signed by Ko Thomas MD on 8/12/2020  8:18 AM   Attribution [place]    MS. 1 - Ko Thomas MD on 8/12/2020  8:15 AM                     D/C Summary    No notes of this type exist for this e • Osteoarthrosis, unspecified whether generalized or localized, unspecified site    • Stroke ([facility] [address] 2018[CK.2]       Past Surgical History[CK.1]  Past Surgical History:   Procedure Laterality Date   • CATARACT      erika   • CHOLECYSTECTOMY     • HIP TOTAL RE Standardized Score (AM-PAC Scale): 35.33   CMS Modifier (G-Code): CL[CK. 2]    FUNCTIONAL ABILITY STATUS  Gait Assessment[CK. 1]   Gait Assistance:  Moderate assistance  Distance (ft): 1ft; 1 ft; 1 ft  Assistive Device: Rolling walker  Pattern: Shuffle  Stoop The AM-PAC '6-Clicks' Inpatient Basic Mobility Short Form was completed and this patient is demonstrating a[CK.1] 68.66[CK.2]% degree of impairment in mobility. Research supports that patients with this level of impairment may benefit from[CK. 1] JAVON[CK.2].